# Patient Record
Sex: MALE | HISPANIC OR LATINO | Employment: FULL TIME | URBAN - METROPOLITAN AREA
[De-identification: names, ages, dates, MRNs, and addresses within clinical notes are randomized per-mention and may not be internally consistent; named-entity substitution may affect disease eponyms.]

---

## 2021-06-22 ENCOUNTER — HOSPITAL ENCOUNTER (EMERGENCY)
Facility: CLINIC | Age: 36
Discharge: HOME | End: 2021-06-22
Attending: PHYSICIAN ASSISTANT | Admitting: PHYSICIAN ASSISTANT

## 2021-06-22 ENCOUNTER — APPOINTMENT (OUTPATIENT)
Dept: GENERAL RADIOLOGY | Facility: CLINIC | Age: 36
End: 2021-06-22
Attending: PHYSICIAN ASSISTANT

## 2021-06-22 ENCOUNTER — APPOINTMENT (OUTPATIENT)
Dept: ULTRASOUND IMAGING | Facility: CLINIC | Age: 36
End: 2021-06-22
Attending: PHYSICIAN ASSISTANT

## 2021-06-22 VITALS
DIASTOLIC BLOOD PRESSURE: 100 MMHG | OXYGEN SATURATION: 98 % | SYSTOLIC BLOOD PRESSURE: 153 MMHG | TEMPERATURE: 98.2 F | RESPIRATION RATE: 18 BRPM | HEART RATE: 66 BPM

## 2021-06-22 DIAGNOSIS — M79.605 PAIN IN BOTH LOWER EXTREMITIES: ICD-10-CM

## 2021-06-22 DIAGNOSIS — M79.604 PAIN IN BOTH LOWER EXTREMITIES: ICD-10-CM

## 2021-06-22 LAB
ANION GAP SERPL CALCULATED.3IONS-SCNC: 5 MMOL/L (ref 3–14)
BASOPHILS # BLD AUTO: 0.1 10E9/L (ref 0–0.2)
BASOPHILS NFR BLD AUTO: 0.8 %
BUN SERPL-MCNC: 11 MG/DL (ref 7–30)
CALCIUM SERPL-MCNC: 9.6 MG/DL (ref 8.5–10.1)
CHLORIDE SERPL-SCNC: 102 MMOL/L (ref 94–109)
CO2 SERPL-SCNC: 29 MMOL/L (ref 20–32)
CREAT SERPL-MCNC: 0.81 MG/DL (ref 0.66–1.25)
CRP SERPL-MCNC: <2.9 MG/L (ref 0–8)
DIFFERENTIAL METHOD BLD: NORMAL
EOSINOPHIL # BLD AUTO: 0.1 10E9/L (ref 0–0.7)
EOSINOPHIL NFR BLD AUTO: 1.2 %
ERYTHROCYTE [DISTWIDTH] IN BLOOD BY AUTOMATED COUNT: 11.4 % (ref 10–15)
ERYTHROCYTE [SEDIMENTATION RATE] IN BLOOD BY WESTERGREN METHOD: 7 MM/H (ref 0–15)
GFR SERPL CREATININE-BSD FRML MDRD: >90 ML/MIN/{1.73_M2}
GLUCOSE SERPL-MCNC: 94 MG/DL (ref 70–99)
HCT VFR BLD AUTO: 48 % (ref 40–53)
HGB BLD-MCNC: 16.6 G/DL (ref 13.3–17.7)
IMM GRANULOCYTES # BLD: 0 10E9/L (ref 0–0.4)
IMM GRANULOCYTES NFR BLD: 0.2 %
LYMPHOCYTES # BLD AUTO: 2.4 10E9/L (ref 0.8–5.3)
LYMPHOCYTES NFR BLD AUTO: 27.8 %
MCH RBC QN AUTO: 30.5 PG (ref 26.5–33)
MCHC RBC AUTO-ENTMCNC: 34.6 G/DL (ref 31.5–36.5)
MCV RBC AUTO: 88 FL (ref 78–100)
MONOCYTES # BLD AUTO: 0.4 10E9/L (ref 0–1.3)
MONOCYTES NFR BLD AUTO: 5 %
NEUTROPHILS # BLD AUTO: 5.6 10E9/L (ref 1.6–8.3)
NEUTROPHILS NFR BLD AUTO: 65 %
NRBC # BLD AUTO: 0 10*3/UL
NRBC BLD AUTO-RTO: 0 /100
PLATELET # BLD AUTO: 368 10E9/L (ref 150–450)
POTASSIUM SERPL-SCNC: 3.9 MMOL/L (ref 3.4–5.3)
RBC # BLD AUTO: 5.45 10E12/L (ref 4.4–5.9)
SODIUM SERPL-SCNC: 136 MMOL/L (ref 133–144)
WBC # BLD AUTO: 8.6 10E9/L (ref 4–11)

## 2021-06-22 PROCEDURE — 80048 BASIC METABOLIC PNL TOTAL CA: CPT | Performed by: PHYSICIAN ASSISTANT

## 2021-06-22 PROCEDURE — 72100 X-RAY EXAM L-S SPINE 2/3 VWS: CPT

## 2021-06-22 PROCEDURE — 85025 COMPLETE CBC W/AUTO DIFF WBC: CPT | Performed by: PHYSICIAN ASSISTANT

## 2021-06-22 PROCEDURE — 99285 EMERGENCY DEPT VISIT HI MDM: CPT | Mod: 25

## 2021-06-22 PROCEDURE — 86140 C-REACTIVE PROTEIN: CPT | Performed by: PHYSICIAN ASSISTANT

## 2021-06-22 PROCEDURE — 93970 EXTREMITY STUDY: CPT

## 2021-06-22 PROCEDURE — 85652 RBC SED RATE AUTOMATED: CPT | Performed by: PHYSICIAN ASSISTANT

## 2021-06-22 PROCEDURE — 250N000013 HC RX MED GY IP 250 OP 250 PS 637: Performed by: PHYSICIAN ASSISTANT

## 2021-06-22 RX ORDER — OXYCODONE HYDROCHLORIDE 5 MG/1
5 TABLET ORAL EVERY 6 HOURS PRN
Qty: 12 TABLET | Refills: 0 | Status: SHIPPED | OUTPATIENT
Start: 2021-06-22 | End: 2021-06-24

## 2021-06-22 RX ORDER — IBUPROFEN 600 MG/1
600 TABLET, FILM COATED ORAL ONCE
Status: COMPLETED | OUTPATIENT
Start: 2021-06-22 | End: 2021-06-22

## 2021-06-22 RX ADMIN — IBUPROFEN 600 MG: 600 TABLET, FILM COATED ORAL at 16:54

## 2021-06-22 ASSESSMENT — ENCOUNTER SYMPTOMS
CHILLS: 0
NAUSEA: 1
FEVER: 0
BACK PAIN: 1
ABDOMINAL PAIN: 1
SHORTNESS OF BREATH: 0

## 2021-06-22 NOTE — LETTER
June 22, 2021      To Whom It May Concern:      Ancelmo James was seen in our Emergency Department today, 06/22/21.  I expect his condition to improve over the next 3 days.  He may return to work/school when improved.    Sincerely,        Francheska Feng RN

## 2021-06-22 NOTE — ED PROVIDER NOTES
.    History   Chief Complaint:  Bilateral leg pain    The history is provided by the spouse and the patient. The history is limited by a language barrier. A  was used (S[place]ankan, spouse interpreted).      Ancelmo James is a 36 year old male who presents with bilateral leg pain. The patient's wife reports that the patient began having bilateral leg pain radiating from his bottom to his feet with associated weakness starting yesterday (6/21). He tried going to work today but his pain worsened and he had to come to the ED. He is unable to find a comfortable position that alleviates his pain. He also reports having abdominal pain and nausea which began yesterday. No recent fever, chills, cough, congestion, chest pain, shortness of breath, and emesis. Of note, he had his second COVID shot on 6/13.    Review of Systems   Constitutional: Negative for chills and fever.   HENT: Negative for congestion.    Respiratory: Negative for shortness of breath.    Cardiovascular: Negative for chest pain.   Gastrointestinal: Positive for abdominal pain and nausea.   Musculoskeletal: Positive for back pain (lower).        Bilateral lower extremity pain   All other systems reviewed and are negative.      Allergies:  The patient has no known allergies.     Medications:  The patient is not currently taking any prescribed medications.    Past Medical History:    The patient denies past medical history     Social History:  The patient presents with wife  The patient's primary language is Sri Lankan.  Physical Exam     Patient Vitals for the past 24 hrs:   BP Temp Temp src Pulse Resp SpO2   06/22/21 1443 (!) 138/91 98.2  F (36.8  C) Temporal 70 18 98 %       Physical Exam  Constitutional: Pleasant. Cooperative.   Eyes: Pupils equally round and reactive  HENT: Head is normal in appearance. Oropharynx is normal with moist mucus membranes.  Cardiovascular: Regular rate and rhythm and without murmurs.  Respiratory: Normal  respiratory effort, lungs are clear bilaterally.  Abd: No TTP. Soft, non-distended.  Musculoskeletal: No asymmetry of the lower extremities, no tenderness to palpation. No midline TTP of back. No paraspinal TTP of back. Mild tenderness to posterior aspect of R distal thigh. Full ROM of bilateral lower extremities. 2+ DP pulses. 5/5 strength to bilateral lower extremities.  Skin: Normal, without rash.  Neurologic: Cranial nerves grossly intact, normal cognition, no focal deficits. Alert and oriented x 3. Sensation intact to bilateral lower extremities.  Psychiatric: Normal affect.  Nursing notes and vital signs reviewed.    Emergency Department Course   Imaging:  Lumbar spine XR, 2-3 views   Final Result   IMPRESSION: 5 nonrib-bearing lumbar type vertebral bodies. Straightening of the usual lumbar spine lordosis. Lumbar vertebral body heights are maintained. Minor lumbar spondylitic changes. Alignment of the lumbar vertebral bodies is anatomic on lateral    radiograph.      US Lower Extremity Venous Duplex Bilateral   Final Result   IMPRESSION:   1.  No deep venous thrombosis in the bilateral lower extremities.          Laboratory:    CBC: WBC: 8.6, HGB: 16.6, PLT: 368  BMP: WNL (Creatinine: 0.81)  CRP inflammation: <2.9  Erythrocyte sedimentation rate auto: 7    Emergency Department Course:    Reviewed:    I reviewed the patient's nursing notes, vitals, past medical records, Care Everywhere.     Assessments:    1625: I performed an exam of the patient and obtained history, as documented above.      1815: I rechecked the patient and updated them on findings. They are amenable to discharge.     Interventions:  1654: Ibuprofen 600 mg PO     Disposition:  The patient was discharged to home.       Impression & Plan   Medical Decision Making:  Ancelmo James is a 36 year old male who presents to the ED for evaluation of bilateral leg pain. See HPI as above for additional details. Vitals and physical exam as above.  DDx was broad and included DVT, referred pain from back, electrolyte abnormality, baker's cyst, GBS, MG, other neurologic issue, among others. Work up as above without clear evidence for etiology of patient's symptoms. No leukocytosis. No electrolyte abnormality. No elevation of inflammatory markers. No evidence for abnormality of imaging. Will provide patient Rx for oxycodone as below, discussed narcotic precautions. Advised very close follow up with PCP given unclear etiology of symptoms, close return precautions. Reassuring neurologic exam to lower extremities suggesting against GBS or MG at this time, although cannot be definitively ruled out. Overall, felt patient was safe for discharge to home. Discussed reasons to return. All questions answered. Patient discharged to home in stable condition.    Diagnosis:    ICD-10-CM    1. Pain in both lower extremities  M79.604     M79.605        Discharge Medications:  New Prescriptions    OXYCODONE (ROXICODONE) 5 MG TABLET    Take 1 tablet (5 mg) by mouth every 6 hours as needed for pain       Scribe Disclosure:  I, Lamine Smith, am serving as a scribe at 4:07 PM on 6/22/2021 to document services personally performed by Kaleb Melgoza PA-C based on my observations and the provider's statements to me.     This record was created at least in part using electronic voice recognition software, so please excuse any typographical errors.           Kaleb Melgoza PA-C  06/22/21 9660

## 2021-06-22 NOTE — ED TRIAGE NOTES
Diffuse bilateral leg pain. Standing or sitting doesn't make it work. Unable to get comfortable. No swelling or erythema. Legs feel weaker than normal

## 2021-06-24 ENCOUNTER — OFFICE VISIT (OUTPATIENT)
Dept: FAMILY MEDICINE | Facility: CLINIC | Age: 36
End: 2021-06-24

## 2021-06-24 VITALS
WEIGHT: 210 LBS | DIASTOLIC BLOOD PRESSURE: 80 MMHG | BODY MASS INDEX: 30.06 KG/M2 | TEMPERATURE: 98.2 F | OXYGEN SATURATION: 98 % | RESPIRATION RATE: 18 BRPM | SYSTOLIC BLOOD PRESSURE: 130 MMHG | HEIGHT: 70 IN | HEART RATE: 78 BPM

## 2021-06-24 DIAGNOSIS — M79.604 LEG PAIN, BILATERAL: Primary | ICD-10-CM

## 2021-06-24 DIAGNOSIS — M79.605 LEG PAIN, BILATERAL: Primary | ICD-10-CM

## 2021-06-24 DIAGNOSIS — R20.2 PARESTHESIA: ICD-10-CM

## 2021-06-24 LAB
FOLATE SERPL-MCNC: 17.2 NG/ML
VIT B12 SERPL-MCNC: 429 PG/ML (ref 193–986)

## 2021-06-24 PROCEDURE — 82746 ASSAY OF FOLIC ACID SERUM: CPT | Performed by: PHYSICIAN ASSISTANT

## 2021-06-24 PROCEDURE — 84443 ASSAY THYROID STIM HORMONE: CPT | Performed by: PHYSICIAN ASSISTANT

## 2021-06-24 PROCEDURE — 82607 VITAMIN B-12: CPT | Performed by: PHYSICIAN ASSISTANT

## 2021-06-24 PROCEDURE — 99214 OFFICE O/P EST MOD 30 MIN: CPT | Performed by: PHYSICIAN ASSISTANT

## 2021-06-24 PROCEDURE — 36415 COLL VENOUS BLD VENIPUNCTURE: CPT | Performed by: PHYSICIAN ASSISTANT

## 2021-06-24 RX ORDER — GABAPENTIN 100 MG/1
CAPSULE ORAL
Qty: 18 CAPSULE | Refills: 0 | Status: SHIPPED | OUTPATIENT
Start: 2021-06-24 | End: 2021-06-25

## 2021-06-24 RX ORDER — GABAPENTIN 100 MG/1
CAPSULE ORAL
Qty: 60 CAPSULE | Refills: 1 | Status: SHIPPED | OUTPATIENT
Start: 2021-06-24 | End: 2021-08-26

## 2021-06-24 ASSESSMENT — MIFFLIN-ST. JEOR: SCORE: 1898.8

## 2021-06-24 ASSESSMENT — PAIN SCALES - GENERAL: PAINLEVEL: EXTREME PAIN (8)

## 2021-06-24 NOTE — PROGRESS NOTES
Assessment & Plan     Leg pain, bilateral  Unclear etiology for patient's symptoms. Further evaluation with thyroid and B12 testing. Referral to neurology placed for further evaluation. Will treat with gabapentin in the mean time.  We will call with lab results.  - TSH with free T4 reflex  - NEUROLOGY ADULT REFERRAL  - gabapentin (NEURONTIN) 100 MG capsule; Increase dose by 100 mg every 3 days until taking 3 tablets 3 times daily.  - gabapentin (NEURONTIN) 100 MG capsule; Take 1 capsule (100 mg) by mouth daily for 3 days, THEN 1 capsule (100 mg) 2 times daily for 3 days, THEN 1 capsule (100 mg) 3 times daily for 3 days.    Paresthesia  As noted above.  - Folate  - Vitamin B12  - TSH with free T4 reflex  - NEUROLOGY ADULT REFERRAL  - gabapentin (NEURONTIN) 100 MG capsule; Increase dose by 100 mg every 3 days until taking 3 tablets 3 times daily.  - gabapentin (NEURONTIN) 100 MG capsule; Take 1 capsule (100 mg) by mouth daily for 3 days, THEN 1 capsule (100 mg) 2 times daily for 3 days, THEN 1 capsule (100 mg) 3 times daily for 3 days.    Review of external notes as documented elsewhere in note  Ordering of each unique test  Prescription drug management  36 minutes spent on the date of the encounter doing chart review, history and exam, documentation and further activities per the note        Rosa Leggett PA-C  [facility] NEFTALI Ag is a 36 year old who presents for the following health issues  accompanied by his spouse:    HPI     ED/UC Followup:    Facility:  [facility]   Date of visit: 06/22/2021  Reason for visit: leg pain   Current Status: still worst. Worst at night. Burning in the feet      Patient is a 36-year-old male who presents today for follow-up from the ER. Patient was seen in the ER on 6/22/2021 for evaluation of bilateral leg pain. The pain began with pain in the entire leg both sides. The patient notes currently he is having pain in the  "bottom of his feet. He was unable to find a comfortable position to alleviate pain. Patient had work up including CBC. BMP, CRP, ESR and ultrasound of the lower extremities as well as x-ray of the lumbar spine. Patient was provided with oxycodone for pain relieve and discharged to home for further evaluation with primary clinic. The oxycodone is not helping. He has also tried Tylenol with no improvement. He did try an antiinflammatory with some improvement in the leg pain.    He is having tingling and burning in the bilateral ankles and feet. Even having shoes on his feet is causing significant burning pain. He notes he is having some pain behind the knees bilaterally as well.      Review of Systems   GENERAL:  No fevers  MUSCULOSKELETAL: As noted in HPI            Objective    /80 (BP Location: Right arm, Patient Position: Sitting, Cuff Size: Adult Large)   Pulse 78   Temp 98.2  F (36.8  C)   Resp 18   Ht 1.778 m (5' 10\")   Wt 95.3 kg (210 lb)   SpO2 98%   BMI 30.13 kg/m    Body mass index is 30.13 kg/m .  Physical Exam   GENERAL: No acute distress  HEENT: Normocephalic  VASCULAR: 2+ DP and PT pulses bilaterally  EXTREMITIES: Bilateral feet with mild edema and erythema.  Right lower extremity: No baker's cyst noted on exam. Unable to reproduce pain. No obvious deformity. No tenderness over the lateral or medial malleolus or over the dorsum or plantar aspects of the foot.   Left lower extremity: No baker's cyst noted on exam. Unable to reproduce pain. No obvious deformity. No tenderness over the lateral or medial malleolus or over the dorsum or plantar aspects of the foot. Full range of motion with dorsiflexion and plantarflexion as well as internal and external rotation.  NEURO: Alert, non-focal          "

## 2021-06-25 ENCOUNTER — NURSE TRIAGE (OUTPATIENT)
Dept: NURSING | Facility: CLINIC | Age: 36
End: 2021-06-25

## 2021-06-25 DIAGNOSIS — M79.605 LEG PAIN, BILATERAL: ICD-10-CM

## 2021-06-25 DIAGNOSIS — R20.2 PARESTHESIA: ICD-10-CM

## 2021-06-25 DIAGNOSIS — M79.604 LEG PAIN, BILATERAL: ICD-10-CM

## 2021-06-25 LAB — TSH SERPL DL<=0.005 MIU/L-ACNC: 2.17 MU/L (ref 0.4–4)

## 2021-06-25 RX ORDER — GABAPENTIN 100 MG/1
CAPSULE ORAL
Qty: 15 CAPSULE | Refills: 0 | Status: SHIPPED | OUTPATIENT
Start: 2021-06-25 | End: 2021-08-31

## 2021-06-25 NOTE — TELEPHONE ENCOUNTER
Called pharmacy and spoke to Cookie.  Need to send new RX with directions to state the increased directions and on that RX put OK to fill 2nd RX early.  Please advise.  Fior Jennings RN

## 2021-06-25 NOTE — TELEPHONE ENCOUNTER
Please call patient and let him know that if he would like to increase to 1 tablet twice daily today he can. I would recommend staying on that dose for 3 days though.     Please also let him know that his testing for B12 and folate are normal. Thyroid testing pending.

## 2021-06-25 NOTE — TELEPHONE ENCOUNTER
Called and spoke with patient, relayed instructions to patient and result note. Patient verbalized understanding and will follow plan. Patient will contact clinic if has any issues or concerns.     Blane MORRELL RN

## 2021-06-25 NOTE — TELEPHONE ENCOUNTER
Called and spoke with patient via . Relayed provider message. Routing back to provider for further clarification. Can call patient back after thyroid testing resulted and reviewed if possible.     Blane MORRELL RN

## 2021-06-25 NOTE — TELEPHONE ENCOUNTER
Typically we increase dose after 3 days. So we are jumping the gun with increasing after 1 day. I would like him to be on the 1 tablet twice daily for 3 days prior to changing to 1 tablet three times daily.    He was instructed to increase the dose by 100 mg every 3 days after getting to 1 tablet three times daily.    Thyroid testing is normal.

## 2021-06-25 NOTE — TELEPHONE ENCOUNTER
Ancelmo will need a [place] Islander interpretor.    Ancelmo felt some relief yesterday after taking one 100 mg capsule of gabapentin. The relief only lasted a couple of hours.  He did not notice any side effects.  Ancelmo wants to know if he can increase his dose already today.    Ancelmo can be reached at [phone]    COVID 19 Nurse Triage Plan/Patient Instructions    Please be aware that novel coronavirus (COVID-19) may be circulating in the community. If you develop symptoms such as fever, cough, or SOB or if you have concerns about the presence of another infection including coronavirus (COVID-19), please contact your health care provider or visit [email or web address].     Disposition/Instructions    Home care recommended. Follow home care protocol based instructions.    Thank you for taking steps to prevent the spread of this virus.  o Limit your contact with others.  o Wear a simple mask to cover your cough.  o Wash your hands well and often.    Resources    [facility]: About COVID-19: [email or web address]    CDC: What to Do If You're Sick: www.cdc.gov/coronavirus/2019-ncov/about/steps-when-sick.html    CDC: Ending Home Isolation: www.cdc.gov/coronavirus/2019-ncov/hcp/disposition-in-home-patients.html     CDC: Caring for Someone: www.cdc.gov/coronavirus/2019-ncov/if-you-are-sick/care-for-someone.html     [facility]: Interim Guidance for Hospital Discharge to Home: www.health.[facility].mn.[place]/diseases/coronavirus/hcp/hospdischarge.pdf    [facility] clinical trials (COVID-19 research studies): clinicalaffairs.[facility]/umn-clinical-trials     Below are the COVID-19 hotlines at the [facility] ([facility]). Interpreters are available.   o For health questions: Call 075-090-2588 or 1-553.478.3469 (7 a.m. to 7 p.m.)  o For questions about schools and childcare: Call 183-382-3178 or 1-529.362.3379 (7 a.m. to 7 p.m.)     Additional Information    Nursing judgment    Protocols used:  INFORMATION ONLY CALL - NO TRIAGE-A-OH    Regina CARTAGENA, RN [facility]

## 2021-06-25 NOTE — TELEPHONE ENCOUNTER
Patient was given 2 prescriptions for Gabapentin, one being a tapering dose and one a daily dose.   Yesterday was told can increase dose right away to 1 tab twice a day. He will run out sooner and will start daily dose sooner.   Due to leaving tomorrow for a week ,would like to pick other prescription up today and pharmacy needs ok to fill sooner      Cristy YOUSIFN, RN

## 2021-08-26 DIAGNOSIS — M79.604 LEG PAIN, BILATERAL: ICD-10-CM

## 2021-08-26 DIAGNOSIS — R20.2 PARESTHESIA: ICD-10-CM

## 2021-08-26 DIAGNOSIS — M79.605 LEG PAIN, BILATERAL: ICD-10-CM

## 2021-08-26 RX ORDER — GABAPENTIN 100 MG/1
CAPSULE ORAL
Qty: 60 CAPSULE | Refills: 1 | Status: SHIPPED | OUTPATIENT
Start: 2021-08-26 | End: 2021-08-31

## 2021-08-26 NOTE — TELEPHONE ENCOUNTER
Needs request filled urgently he is currently out of medication. Lor Wilson RN on 8/26/2021 at 5:08 PM

## 2021-08-26 NOTE — TELEPHONE ENCOUNTER
See message below and please assist patient in scheduling.     BENJA SidhuN, RN  [facility]

## 2021-08-31 ENCOUNTER — OFFICE VISIT (OUTPATIENT)
Dept: FAMILY MEDICINE | Facility: CLINIC | Age: 36
End: 2021-08-31

## 2021-08-31 VITALS
RESPIRATION RATE: 16 BRPM | WEIGHT: 216.6 LBS | BODY MASS INDEX: 31.08 KG/M2 | DIASTOLIC BLOOD PRESSURE: 90 MMHG | SYSTOLIC BLOOD PRESSURE: 138 MMHG | OXYGEN SATURATION: 98 % | HEART RATE: 78 BPM

## 2021-08-31 DIAGNOSIS — J30.89 SEASONAL ALLERGIC RHINITIS DUE TO OTHER ALLERGIC TRIGGER: Primary | ICD-10-CM

## 2021-08-31 DIAGNOSIS — M79.605 LEG PAIN, BILATERAL: ICD-10-CM

## 2021-08-31 DIAGNOSIS — M79.604 LEG PAIN, BILATERAL: ICD-10-CM

## 2021-08-31 DIAGNOSIS — R20.2 PARESTHESIA: ICD-10-CM

## 2021-08-31 PROCEDURE — 99213 OFFICE O/P EST LOW 20 MIN: CPT | Performed by: PHYSICIAN ASSISTANT

## 2021-08-31 RX ORDER — MOMETASONE FUROATE MONOHYDRATE 50 UG/1
2 SPRAY, METERED NASAL DAILY
Qty: 17 G | Refills: 1 | Status: SHIPPED | OUTPATIENT
Start: 2021-08-31 | End: 2021-09-21

## 2021-08-31 RX ORDER — GABAPENTIN 100 MG/1
100 CAPSULE ORAL 3 TIMES DAILY
Qty: 90 CAPSULE | Refills: 1 | Status: SHIPPED | OUTPATIENT
Start: 2021-08-31 | End: 2021-09-21

## 2021-08-31 RX ORDER — LEVOCETIRIZINE DIHYDROCHLORIDE 5 MG/1
5 TABLET, FILM COATED ORAL EVERY EVENING
Qty: 30 TABLET | Refills: 1 | Status: SHIPPED | OUTPATIENT
Start: 2021-08-31 | End: 2021-09-21

## 2021-08-31 NOTE — PROGRESS NOTES
Assessment & Plan     Seasonal allergic rhinitis due to other allergic trigger  Patient interested in allergy testing. Will try a different antihistamine in the mean time along with steroid nasal spray as noted below.  - Adult Allergy/Asthma Referral; Future  - levocetirizine (XYZAL) 5 MG tablet; Take 1 tablet (5 mg) by mouth every evening  - mometasone (NASONEX) 50 MCG/ACT nasal spray; Spray 2 sprays into both nostrils daily  - Care Coordination Referral; Future    Leg pain, bilateral  Improved with gabapentin 100 mg three times daily. Has yet to follow up with neurology.  - gabapentin (NEURONTIN) 100 MG capsule; Take 1 capsule (100 mg) by mouth 3 times daily Increase dose by 100 mg every 3 days until taking 3 tablets 3 times daily.  - Care Coordination Referral; Future    Paresthesia  As noted above.  - gabapentin (NEURONTIN) 100 MG capsule; Take 1 capsule (100 mg) by mouth 3 times daily Increase dose by 100 mg every 3 days until taking 3 tablets 3 times daily.  - Care Coordination Referral; Future    Review of external notes as documented elsewhere in note  Prescription drug management    Rosa Leggett PA-C  [facility] GAGE Ag is a 36 year old who presents for the following health issues  accompanied by his spouse (acting as partial ):    HPI     Allergies  Onset/Duration: About 5 yrs   Symptoms:   Nasal congestion: YES- A lot. Patient can't breath through his nose when his allergies are acting up because the nose is so plugged and has to breath through his nose  Sneezing: YES  Red, itchy eyes: YES eyes are really swelled in the morning and really itchy  Progression of Symptoms: worse  Accompanying Signs & Symptoms:  Cough: no  Wheezing: YES  Rash: no  Sinus/facial pain: no  History:   Is it seasonal: in the fall and in the spring   History of Asthma: no  Has allergy testing been done: no  Precipitating factors:   None  Alleviating  factors:  None  Therapies tried and outcome: Zyrtec D for two years. Pharmacy said he should not take it no more because they can only sell it to someone so many times    Review of Systems   GENERAL:  No fevers         Objective    BP (!) 138/90 (BP Location: Right arm, Patient Position: Sitting, Cuff Size: Adult Large)   Pulse 78   Resp 16   Wt 98.2 kg (216 lb 9.6 oz)   SpO2 98%   BMI 31.08 kg/m    Body mass index is 31.08 kg/m .  Physical Exam   GENERAL: No acute distress  HEENT: Normocephalic, PERRL, Canals patent, bilateral TM's non-erythematous and non-bulging. Turbinates normal in appearance bilaterally. Posterior oropharynx non-erythematous and without exudate.  NECK: No cervical or supraclavicular lymphadenopathy.  CARDIAC: Regular rate and rhythm. No murmurs.  PULMONARY: Lungs are clear to auscultation bilaterally. No wheezes, rhonchi or crackles.  NEURO: Alert and non-focal

## 2021-09-01 ENCOUNTER — APPOINTMENT (OUTPATIENT)
Dept: INTERPRETER SERVICES | Facility: CLINIC | Age: 36
End: 2021-09-01

## 2021-09-01 ENCOUNTER — PATIENT OUTREACH (OUTPATIENT)
Dept: NURSING | Facility: CLINIC | Age: 36
End: 2021-09-01

## 2021-09-01 NOTE — PROGRESS NOTES
Clinic Care Coordination Contact  Community Health Worker Initial Outreach  Spoke with patient using     Chart Review: Referral from PCP for insurance. F/u appt on 9/21    CHW Initial Information Gathering:  Referral Source: PCP  Preferred Hospital: [facility] [place]  [phone]  Current living arrangement:: I live in a private home with family  Supplies used at home:: None  Equipment Currently Used at Home: none  No PCP office visit in Past Year: No  CHW Additional Questions  If ED/Hospital discharge, follow-up appointment scheduled as recommended?: N/A  Medication changes made following ED/Hospital discharge?: N/A  MyChart active?: No    CHW introduced self and role with CC  Patient states he is currently at work, lots of background noise but ok to talk quickly.   Patient states that he is interested in applying for health insurance, he has been wondering how to begin the process.   CHW explained FRW and SW roles, submitted FRW referral as below and scheduled SW f/u.     Patient accepts CC: Yes. Patient scheduled for assessment with SWCC on 9/2 at 4:00pm. Patient noted desire to discuss assistance applying for health insurnace .     Financial Resource Worker Screening    [facility] Benefits  Is patient requesting help applying for [facility] benefits?: No    [place]urance:  [place] No  Is this an insurance renewal?: No  Is this a new insurance application request?: Yes  Have you recently applied for insurance?: No  How many people in your household? : 4  Do you file taxes?: Yes  How many dependents do you claim?: 2  What is the monthly gross income for the household (wages, social security, workers comp, and pension)? : 4000      Care Coordination team will tell patient:   Thank you for answering all the questions, based on screening questions, our Financial Resource Worker will reach out to you with additional questions and next steps.    Silvia  SHARLENE Love, B.S. [facility]  Clinic Care Coordination  [facility]:  [place], [place] and [place]  [phone]  [email or web address]

## 2021-09-02 ENCOUNTER — APPOINTMENT (OUTPATIENT)
Dept: INTERPRETER SERVICES | Facility: CLINIC | Age: 36
End: 2021-09-02

## 2021-09-02 ENCOUNTER — PATIENT OUTREACH (OUTPATIENT)
Dept: CARE COORDINATION | Facility: CLINIC | Age: 36
End: 2021-09-02

## 2021-09-02 NOTE — PROGRESS NOTES
Left voicemail for patient letting him know that [facility] will be unavailable to call him today (9/2) at 4:00pm and she will outreach to him tomorrow at 4pm instead.     Silvia Love, SHARLENE, B.S. [facility] Care Coordination  [facility]:  [place], [place] and [place]  [phone]  [email or web address]

## 2021-09-02 NOTE — TELEPHONE ENCOUNTER
Clinic Care Coordination Contact  Program: Health Insurance   County:  [facility] Case #:  [facility] Worker:   Latesha #:   Subscriber #:   Renewal:  Date Applied:     HERNÁN Outreach:   9/2/21: FRW called patient on referral. Patient's family yearly income is $57,000 gross. Patient is over income for MA/MNCARE. Patient will need to wait until open enrollment in November to apply for insurance through [employer]. Routing note to CC team.     Health Insurance:      Referral/Screening:     Financial Resource Worker Screening     [facility] Benefits  Is patient requesting help applying for [facility] benefits?: No     Insurance:  [place]?: No  Is this an insurance renewal?: No  Is this a new insurance application request?: Yes  Have you recently applied for insurance?: No  How many people in your household? : 4  Do you file taxes?: Yes   How many dependents do you claim?: 2  What is the monthly gross income for the household (wages, social security, workers comp, and pension)? : 4800        Care Coordination team will tell patient:   Thank you for answering all the questions, based on screening questions, our Financial Resource Worker will reach out to you with additional questions and next steps.    Goals Addressed:

## 2021-09-09 ENCOUNTER — APPOINTMENT (OUTPATIENT)
Dept: INTERPRETER SERVICES | Facility: CLINIC | Age: 36
End: 2021-09-09

## 2021-09-09 ENCOUNTER — PATIENT OUTREACH (OUTPATIENT)
Dept: CARE COORDINATION | Facility: CLINIC | Age: 36
End: 2021-09-09

## 2021-09-09 NOTE — TELEPHONE ENCOUNTER
Clinic Care Coordination Contact  Program: [facility]  County:  [facility] Case #:  [place] Worker:   Latesha #:   Subscriber #:   Renewal:  Date Applied:     FRW Outreach:   9/9/21: FRW called patient to discuss erich care. FRW left VM and will make another outreach in 1 week.   9/2/21: FRW called patient on referral. Patient's family yearly income is $57,000 gross. Patient is over income for MA/MNCARE. Patient will need to wait until open enrollment in November to apply for insurance [facility]. Routing note to CC team.     Health Insurance:      Referral/Screening:     Financial Resource Worker Screening     [facility] Benefits  Is patient requesting help applying for [facility] benefits?: No     Insurance:  [place]?: No  Is this an insurance renewal?: No  Is this a new insurance application request?: Yes  Have you recently applied for insurance?: No  How many people in your household? : 4  Do you file taxes?: Yes   How many dependents do you claim?: 2  What is the monthly gross income for the household (wages, social security, workers comp, and pension)? : 4800        Care Coordination team will tell patient:   Thank you for answering all the questions, based on screening questions, our Financial Resource Worker will reach out to you with additional questions and next steps.    Goals Addressed:

## 2021-09-15 ENCOUNTER — PATIENT OUTREACH (OUTPATIENT)
Dept: CARE COORDINATION | Facility: CLINIC | Age: 36
End: 2021-09-15

## 2021-09-15 ENCOUNTER — APPOINTMENT (OUTPATIENT)
Dept: INTERPRETER SERVICES | Facility: CLINIC | Age: 36
End: 2021-09-15

## 2021-09-15 NOTE — TELEPHONE ENCOUNTER
Clinic Care Coordination Contact  Program: [facility]  County:  [facility] Case #:  [facility] Worker:   Latesha #:   Subscriber #:   Renewal:  Date Applied:     FRW Outreach:   9/15/21: Outreach attempted x 2. Left message on voicemail indicating last outreach attempt.   Plan: FRW will send an unreachable letter and remove from panel. Patient can be referred back to FRW.      9/9/21: FRW called patient to discuss erich care. FRW left VM and will make another outreach in 1 week.   9/2/21: FRW called patient on referral. Patient's family yearly income is $57,000 gross. Patient is over income for MA/MNCARE. Patient will need to wait until open enrollment in November to apply for insurance through [employer]. Routing note to CC team.     Health Insurance:      Referral/Screening:     Financial Resource Worker Screening     [facility] Benefits  Is patient requesting help applying for [facility] benefits?: No     Insurance:  [place]?: No  Is this an insurance renewal?: No  Is this a new insurance application request?: Yes  Have you recently applied for insurance?: No  How many people in your household? : 4  Do you file taxes?: Yes   How many dependents do you claim?: 2  What is the monthly gross income for the household (wages, social security, workers comp, and pension)? : 4800        Care Coordination team will tell patient:   Thank you for answering all the questions, based on screening questions, our Financial Resource Worker will reach out to you with additional questions and next steps.    Goals Addressed:

## 2021-09-21 ENCOUNTER — OFFICE VISIT (OUTPATIENT)
Dept: FAMILY MEDICINE | Facility: CLINIC | Age: 36
End: 2021-09-21

## 2021-09-21 VITALS
SYSTOLIC BLOOD PRESSURE: 135 MMHG | BODY MASS INDEX: 32.58 KG/M2 | HEIGHT: 69 IN | OXYGEN SATURATION: 99 % | WEIGHT: 220 LBS | TEMPERATURE: 98.1 F | DIASTOLIC BLOOD PRESSURE: 87 MMHG | HEART RATE: 62 BPM

## 2021-09-21 DIAGNOSIS — M79.604 LEG PAIN, BILATERAL: ICD-10-CM

## 2021-09-21 DIAGNOSIS — R20.2 PARESTHESIA: ICD-10-CM

## 2021-09-21 DIAGNOSIS — M79.605 LEG PAIN, BILATERAL: ICD-10-CM

## 2021-09-21 DIAGNOSIS — Z23 NEED FOR PROPHYLACTIC VACCINATION AND INOCULATION AGAINST INFLUENZA: Primary | ICD-10-CM

## 2021-09-21 DIAGNOSIS — J30.89 SEASONAL ALLERGIC RHINITIS DUE TO OTHER ALLERGIC TRIGGER: ICD-10-CM

## 2021-09-21 DIAGNOSIS — G62.9 PERIPHERAL POLYNEUROPATHY: ICD-10-CM

## 2021-09-21 PROCEDURE — 90471 IMMUNIZATION ADMIN: CPT | Performed by: FAMILY MEDICINE

## 2021-09-21 PROCEDURE — 90686 IIV4 VACC NO PRSV 0.5 ML IM: CPT | Performed by: FAMILY MEDICINE

## 2021-09-21 PROCEDURE — 99214 OFFICE O/P EST MOD 30 MIN: CPT | Mod: 25 | Performed by: FAMILY MEDICINE

## 2021-09-21 RX ORDER — FLUTICASONE PROPIONATE 50 MCG
1 SPRAY, SUSPENSION (ML) NASAL DAILY
Qty: 48 G | Refills: 3 | Status: SHIPPED | OUTPATIENT
Start: 2021-09-21 | End: 2021-09-21

## 2021-09-21 RX ORDER — FEXOFENADINE HCL 180 MG/1
180 TABLET ORAL DAILY
Qty: 90 TABLET | Refills: 3 | Status: SHIPPED | OUTPATIENT
Start: 2021-09-21 | End: 2022-12-02

## 2021-09-21 RX ORDER — GABAPENTIN 100 MG/1
100 CAPSULE ORAL 3 TIMES DAILY
Qty: 270 CAPSULE | Refills: 3 | Status: SHIPPED | OUTPATIENT
Start: 2021-09-21 | End: 2023-12-14

## 2021-09-21 RX ORDER — FLUTICASONE PROPIONATE 50 MCG
2 SPRAY, SUSPENSION (ML) NASAL DAILY
Qty: 48 G | Refills: 3 | Status: SHIPPED | OUTPATIENT
Start: 2021-09-21 | End: 2024-04-11

## 2021-09-21 ASSESSMENT — MIFFLIN-ST. JEOR: SCORE: 1928.29

## 2021-09-21 NOTE — PROGRESS NOTES
"    Assessment & Plan     Need for prophylactic vaccination and inoculation against influenza    - INFLUENZA VACCINE IM > 6 MONTHS VALENT IIV4 (AFLURIA/FLUZONE)    Seasonal allergic rhinitis due to other allergic trigger  Increase dose of flonase to 2 sprays in both nostril advise not to inhale as he sprays, and switch to Allegra once daily, check on patient in 1 month by nurse.   - fluticasone (FLONASE) 50 MCG/ACT nasal spray; Spray 1 spray into both nostrils daily  - fexofenadine (ALLEGRA) 180 MG tablet; Take 1 tablet (180 mg) by mouth daily    Leg pain, bilateral  Mostly peripherla neuropathy, improving significantly on neurontin, keep on same management and recheck with specialist if no improvement (pt will wait due to insurance to see the specialist at this time)  - gabapentin (NEURONTIN) 100 MG capsule; Take 1 capsule (100 mg) by mouth 3 times daily Increase dose by 100 mg every 3 days until taking 3 tablets 3 times daily.    Paresthesia  As above.  - gabapentin (NEURONTIN) 100 MG capsule; Take 1 capsule (100 mg) by mouth 3 times daily Increase dose by 100 mg every 3 days until taking 3 tablets 3 times daily.    Peripheral polyneuropathy  As above.               BMI:   Estimated body mass index is 32.49 kg/m  as calculated from the following:    Height as of this encounter: 1.753 m (5' 9\").    Weight as of this encounter: 99.8 kg (220 lb).   Weight management plan: Discussed healthy diet and exercise guidelines        Return in about 4 weeks (around 10/19/2021) for by nurse call only..    Bandar Thakkar MD  [facility] GAGE Ag is a 36 year old who presents for the following health issues     HPI     New Patient/Transfer of Care  Medication Followup of Gabapentin, levocetirizine, and mometasone     Taking Medication as prescribed: yes    Side Effects:  None    Medication Helping Symptoms:  yes     Chronic Pain Follow-Up    Where in your body do you have pain? Bottom of the " "feet all the way to the calves.   How has your pain affected your ability to work? At the beginning he was struggling with pain that he couldn't go to work, but now he is doing well.  Which of these pain treatments have you tried since your last clinic visit? Gabapentin.  How well are you sleeping? Good  How has your mood been since your last visit? About the same  Have you had a significant life event? No  Other aggravating factors: prolonged standing  Taking medication as directed? Yes    No flowsheet data found.  No flowsheet data found.  No flowsheet data found.  Encounter-Level CSA:    There are no encounter-level csa.     Patient-Level CSA:    There are no patient-level csa.         ALLERGIES      Duration: chronic.    Description:   Nasal congestion: YES- plugged nose, along with itching in the throat and nose.  Sneezing: no  Red, itchy eyes: YES    Accompanying signs and symptoms: runny nose    History (similar episodes/allergy testing): None    Precipitating or alleviating factors: not asked.    Therapies tried and outcome: pt takes levocetrizine and nasal steroids with mild improvement.        Review of Systems         Objective    /87   Pulse 62   Temp 98.1  F (36.7  C) (Oral)   Ht 1.753 m (5' 9\")   Wt 99.8 kg (220 lb)   SpO2 99%   BMI 32.49 kg/m    Body mass index is 32.49 kg/m .  Physical Exam   GENERAL: healthy, alert and no distress  HENT: normal cephalic/atraumatic, ear canals and TM's normal, nasal mucosa edematous , rhinorrhea clear, oropharynx clear and oral mucous membranes moist  foot exam: normal DP and PT pulses, no trophic changes or ulcerative lesions and normal sensory exam  Onychomycosis of some of the toenails.              "

## 2021-10-07 ENCOUNTER — PATIENT OUTREACH (OUTPATIENT)
Dept: CARE COORDINATION | Facility: CLINIC | Age: 36
End: 2021-10-07

## 2021-10-07 ENCOUNTER — APPOINTMENT (OUTPATIENT)
Dept: INTERPRETER SERVICES | Facility: CLINIC | Age: 36
End: 2021-10-07

## 2021-10-07 ENCOUNTER — APPOINTMENT (OUTPATIENT)
Dept: NURSING | Facility: CLINIC | Age: 36
End: 2021-10-07

## 2021-10-07 NOTE — PROGRESS NOTES
Clinic Care Coordination Contact  Community Health Worker Follow Up  Spoke with patient via      Care Gaps:   Health Maintenance Due   Topic Date Due     PREVENTIVE CARE VISIT  Never done     ADVANCE CARE PLANNING  Never done     HIV SCREENING  Never done     HEPATITIS C SCREENING  Never done     Not assessed, patient without health insurance    Goals:   Goals Addressed as of 10/8/2021 at 8:41 AM                    10/7/21       Financial Wellbeing (pt-stated)   10%    Added 9/7/21 by Bhavana Laird LSW      Goal Statement: In the next three months I will increase my sense financial well being  Date Goal set: 9/3/21  Barriers: Currently without health insurance.  I do not qualify for MA  Strengths: I am working  Date to Achieve By: 12/30/21  Patient expressed understanding of goal: Yes  Action steps to achieve this goal:  1. I will apply for [employer] during open enrollment.  Care Coordination will place FRW referral when appropriate.    2. I will [place] applying [place] if needed.  3. I will explore if scholarships are available and/or setting up a payment plan with Neurology in order to be seen while I am without insurance.    4.  Care Coordination will assist  with this goals as needed.    10/7/21 CHW:    Patient states that he has enough pain medication from seeing PCP recently that will cover him until he has insurance to see neurology. Patient was instructed by FRW to apply for [employer] during open enrollment on Nov. 1. He requests help to assist with the application at that time.           Intervention and Education during outreach: CC goal, patient will continue to manage his pain and reach out to the clinic if needed. He will plan to work with FRW on/after Nov. 1 to assist with [employer] application.   Patient denies needing any outstanding questions or concerns.     CHW Plan: We discussed next outreach 3 weeks.     SHARLENE Busch, B.S. [facility]  Coordination  [facility] Clinics:  [place], [place] and [place]  [phone]  [email or web address]

## 2021-10-18 ENCOUNTER — PATIENT OUTREACH (OUTPATIENT)
Dept: CARE COORDINATION | Facility: CLINIC | Age: 36
End: 2021-10-18

## 2021-10-18 ASSESSMENT — ACTIVITIES OF DAILY LIVING (ADL): DEPENDENT_IADLS:: INDEPENDENT

## 2021-10-18 NOTE — LETTER
[facility] CARE COORDINATION  [facility]   December 3, 2021    Ancelmo James  [address]  [place] [address]      Dear Ancelmo,    I have been unsuccessful in reaching you since our last contact. At this time the Care Coordination team will make no further attempts to reach you, however this does not change your ability to continue receiving care from your providers at your primary care clinic. If you need additional support from a care coordinator in the future please contact me at [phone].    All of us at [facility]  are invested in your health and are here to assist you in meeting your goals.     Sincerely,    JOHN Rodriguez   Care Coordination Team  [phone]

## 2021-10-18 NOTE — PROGRESS NOTES
Clinic Care Coordination Contact  Care Coordination Clinician Chart Review  Situation: Patient chart reviewed by care coordinator.       Background: Care Coordination initial assessment and enrollment to Care Coordination was 9/3/21.   Patient centered goals were developed with participation from patient.  CHRISTINE CC handed patient off to CHW for continued outreach every 30 days.        Assessment: Per chart review, patient outreach completed by CC CHW on 10/7/21.  Patient is actively working to accomplish goal.  Patient's goal remains appropriate and relevant at this time.   Patient is not due for updated Plan of Care.  Annual assessment will be due 9/3/22.      Goals        Patient Stated       Financial Wellbeing (pt-stated)       Goal Statement: In the next three months I will increase my sense financial well being  Date Goal set: 9/3/21  Barriers: Currently without health insurance.  I do not qualify for MA  Strengths: I am working  Date to Achieve By: 12/30/21  Patient expressed understanding of goal: Yes  Action steps to achieve this goal:  1. I will apply for [facility] during open enrollment.  Care Coordination will place FRW referral when appropriate.    2. I [place].  3. I will explore if scholarships are available and/or setting up a payment plan with Neurology in order to be seen while I am without insurance.    4.  Care Coordination will assist  with this goals as needed.                  Plan/Recommendations: The patient will continue working with Care Coordination to achieve goal as above.  CHW will involve CHRISTINE BAUMAN as needed or if patient is ready to move to maintenance.  CHRISTINE CC will continue to monitor progress to goals and CHW outreaches every 6 weeks.   Plan of Care updated and mailed to patient: JOHN Banerjee Care Coordination Team  [phone]

## 2021-10-21 ENCOUNTER — TELEPHONE (OUTPATIENT)
Dept: FAMILY MEDICINE | Facility: CLINIC | Age: 36
End: 2021-10-21

## 2021-10-21 NOTE — TELEPHONE ENCOUNTER
Can we call Ancelmo and check on his allergies, are the medicine working?  Bandar Thakkar MD  [facility]  [phone]

## 2021-10-25 NOTE — TELEPHONE ENCOUNTER
Attempt #1 to reach patient regarding message below with the assistance of a . Left voicemail to return phone call to clinic.       BENJA SidhuN, RN  [facility]

## 2021-11-03 ENCOUNTER — PATIENT OUTREACH (OUTPATIENT)
Dept: CARE COORDINATION | Facility: CLINIC | Age: 36
End: 2021-11-03

## 2021-11-03 NOTE — PROGRESS NOTES
Clinic Care Coordination Contact  [facility]/Voicemail       Clinical Data: Care Coordinator Outreach  Outreach attempted x 1.  Left message on patient's voicemail with call back information and requested return call.    Plan: Care Coordinator will try to reach patient again in 10 business days.    SHARLENE Busch, B.S. [facility] Care Coordination  [facility]:  [place] and [place]  [phone]  [email or web address]

## 2021-11-23 ENCOUNTER — PATIENT OUTREACH (OUTPATIENT)
Dept: CARE COORDINATION | Facility: CLINIC | Age: 36
End: 2021-11-23

## 2021-11-23 NOTE — PROGRESS NOTES
Clinic Care Coordination Contact  [facility]/Voicemail       Clinical Data: Care Coordinator Outreach  Outreach attempted x 2.  Left message on patient's voicemail with call back information and requested return call.    Plan: CHW unsuccessful in multiple recent attempts to contact patient and will route to lead CC to review for disenrollment and/or further direction per standard work. Care Coordinator will do no further outreaches at this time.    SHARLENE Busch, B.S. [facility] Care Coordination  [facility] Clinics:  [place], [place] and [place]  [phone]  [email or web address]

## 2021-12-03 NOTE — PROGRESS NOTES
Clinic Care Coordination Contact    CHW has not been able to reach patient after two attempts.  Patient will be sent via mail a dis enrollment letter via mail and no further outreach attempts will be made at this time.      JOHN Rodriguez   Care Coordination Team  [phone]

## 2022-12-01 DIAGNOSIS — J30.89 SEASONAL ALLERGIC RHINITIS DUE TO OTHER ALLERGIC TRIGGER: ICD-10-CM

## 2022-12-01 NOTE — LETTER
December 2, 2022      Ancelmo James  [address]  [place] [address]      Dear Ancelmo,    We recently received a call from your pharmacy requesting a refill of your medication.    We have authorized one refill but a review of your chart indicates that an appointment is required with your provider for any further refills.  Please call the clinic to schedule your appointment.    We have authorized one refill of your medication to allow time for you to schedule.   If you have a history of diabetes or high cholesterol, please come in fasting for the appointment. Fasting entails nothing to eat or drink 8 hours prior to your appointment; with the exception on water. You may take your medication the day of the appointment.    Thank you,      MARCIA Mack Morse

## 2022-12-02 RX ORDER — FEXOFENADINE HCL 180 MG/1
180 TABLET ORAL DAILY
Qty: 90 TABLET | Refills: 0 | Status: SHIPPED | OUTPATIENT
Start: 2022-12-02 | End: 2024-04-11

## 2022-12-02 NOTE — TELEPHONE ENCOUNTER
Medication is being filled for 1 time refill only due to:  Patient needs to be seen because it has been more than one year since last visit.     Eliana refill sent. Routing to /[facility] to assist with getting pt scheduled.    Prescription approved per [facility] Refill Protocol.  Jessica Mckay RN

## 2023-12-14 ENCOUNTER — VIRTUAL VISIT (OUTPATIENT)
Dept: INTERNAL MEDICINE | Facility: CLINIC | Age: 38
End: 2023-12-14
Payer: COMMERCIAL

## 2023-12-14 DIAGNOSIS — J30.89 SEASONAL ALLERGIC RHINITIS DUE TO OTHER ALLERGIC TRIGGER: Primary | ICD-10-CM

## 2023-12-14 PROCEDURE — 99213 OFFICE O/P EST LOW 20 MIN: CPT | Mod: 95 | Performed by: INTERNAL MEDICINE

## 2023-12-14 RX ORDER — CETIRIZINE HYDROCHLORIDE 10 MG/1
10 TABLET ORAL DAILY
Qty: 90 TABLET | Refills: 4 | Status: SHIPPED | OUTPATIENT
Start: 2023-12-14 | End: 2024-04-11

## 2023-12-14 RX ORDER — FEXOFENADINE HCL 180 MG/1
180 TABLET ORAL DAILY
Qty: 90 TABLET | Refills: 4 | Status: CANCELLED | OUTPATIENT
Start: 2023-12-14

## 2023-12-14 NOTE — PROGRESS NOTES
Ancelmo is a 38 year old who is being evaluated via a billable telephone visit.      What phone number would you like to be contacted at? [phone]   How would you like to obtain your AVS? [employer]  Distant Location (provider location):  Off-site    Assessment & Plan   Seasonal allergic rhinitis due to other allergic trigger  Symptoms ongoing, does not feel the fexofenadine works as well as it used to. Recommended trial of a different anti-histamine, rx'd cetirizine. If no improvement or symptoms worsen, recommend in-person evaluation with regular care team.  - cetirizine (ZYRTEC) 10 MG tablet; Take 1 tablet (10 mg) by mouth daily    F/u with regular PCP at regular interval or sooner PRN    Quinn White MD  [facility]   Ancelmo is a 38 year old who presents for a next day acute care virtual telephone visit with chief concern of: med refill. This is the first time I have met Ancelmo, who typically gets care at clinics closer to his residence. We utilized a  for today's visit. He's hoping for a refill of fexofenadine for allergies. He does not feel the effect of this medication is lasting as long as it used to.    Review of Systems   Constitutional, HEENT systems are negative, except as otherwise noted.      Objective     Vitals: No vitals were obtained today due to virtual visit.    Physical Exam   healthy, alert, and no distress  PSYCH: Alert and oriented times 3; coherent speech, normal rate and volume, able to articulate logical thoughts, able to abstract reason, no tangential thoughts, no hallucinations or delusions  His affect is normal  RESP: No cough, no audible wheezing, able to talk in full sentences  Remainder of exam unable to be completed due to telephone visits    Phone call duration: 6 minutes

## 2024-04-11 ENCOUNTER — OFFICE VISIT (OUTPATIENT)
Dept: INTERNAL MEDICINE | Facility: CLINIC | Age: 39
End: 2024-04-11
Payer: COMMERCIAL

## 2024-04-11 VITALS
DIASTOLIC BLOOD PRESSURE: 78 MMHG | HEIGHT: 69 IN | BODY MASS INDEX: 34.66 KG/M2 | TEMPERATURE: 98.8 F | WEIGHT: 234 LBS | HEART RATE: 69 BPM | OXYGEN SATURATION: 98 % | RESPIRATION RATE: 18 BRPM | SYSTOLIC BLOOD PRESSURE: 116 MMHG

## 2024-04-11 DIAGNOSIS — Z23 NEED FOR TDAP VACCINATION: ICD-10-CM

## 2024-04-11 DIAGNOSIS — R09.81 NOSE CONGESTION: ICD-10-CM

## 2024-04-11 DIAGNOSIS — R06.83 SNORING: ICD-10-CM

## 2024-04-11 DIAGNOSIS — Z00.00 ROUTINE GENERAL MEDICAL EXAMINATION AT A HEALTH CARE FACILITY: Primary | ICD-10-CM

## 2024-04-11 DIAGNOSIS — Z11.4 SCREENING FOR HIV (HUMAN IMMUNODEFICIENCY VIRUS): ICD-10-CM

## 2024-04-11 DIAGNOSIS — Z11.59 NEED FOR HEPATITIS C SCREENING TEST: ICD-10-CM

## 2024-04-11 DIAGNOSIS — J01.90 ACUTE SINUSITIS WITH SYMPTOMS > 10 DAYS: ICD-10-CM

## 2024-04-11 DIAGNOSIS — J30.89 SEASONAL ALLERGIC RHINITIS DUE TO OTHER ALLERGIC TRIGGER: ICD-10-CM

## 2024-04-11 LAB
BASOPHILS # BLD AUTO: 0.1 10E3/UL (ref 0–0.2)
BASOPHILS NFR BLD AUTO: 1 %
EOSINOPHIL # BLD AUTO: 0.3 10E3/UL (ref 0–0.7)
EOSINOPHIL NFR BLD AUTO: 3 %
ERYTHROCYTE [DISTWIDTH] IN BLOOD BY AUTOMATED COUNT: 11.7 % (ref 10–15)
HCT VFR BLD AUTO: 47.5 % (ref 40–53)
HGB BLD-MCNC: 16.1 G/DL (ref 13.3–17.7)
IMM GRANULOCYTES # BLD: 0 10E3/UL
IMM GRANULOCYTES NFR BLD: 0 %
LYMPHOCYTES # BLD AUTO: 2.8 10E3/UL (ref 0.8–5.3)
LYMPHOCYTES NFR BLD AUTO: 34 %
MCH RBC QN AUTO: 29.5 PG (ref 26.5–33)
MCHC RBC AUTO-ENTMCNC: 33.9 G/DL (ref 31.5–36.5)
MCV RBC AUTO: 87 FL (ref 78–100)
MONOCYTES # BLD AUTO: 0.6 10E3/UL (ref 0–1.3)
MONOCYTES NFR BLD AUTO: 7 %
NEUTROPHILS # BLD AUTO: 4.7 10E3/UL (ref 1.6–8.3)
NEUTROPHILS NFR BLD AUTO: 56 %
PLATELET # BLD AUTO: 307 10E3/UL (ref 150–450)
RBC # BLD AUTO: 5.46 10E6/UL (ref 4.4–5.9)
WBC # BLD AUTO: 8.4 10E3/UL (ref 4–11)

## 2024-04-11 PROCEDURE — T1013 SIGN LANG/ORAL INTERPRETER: HCPCS | Mod: MA

## 2024-04-11 PROCEDURE — 90471 IMMUNIZATION ADMIN: CPT | Performed by: NURSE PRACTITIONER

## 2024-04-11 PROCEDURE — 99213 OFFICE O/P EST LOW 20 MIN: CPT | Mod: 25 | Performed by: NURSE PRACTITIONER

## 2024-04-11 PROCEDURE — 99395 PREV VISIT EST AGE 18-39: CPT | Mod: 25 | Performed by: NURSE PRACTITIONER

## 2024-04-11 PROCEDURE — 36415 COLL VENOUS BLD VENIPUNCTURE: CPT | Performed by: NURSE PRACTITIONER

## 2024-04-11 PROCEDURE — 80061 LIPID PANEL: CPT | Performed by: NURSE PRACTITIONER

## 2024-04-11 PROCEDURE — 80053 COMPREHEN METABOLIC PANEL: CPT | Performed by: NURSE PRACTITIONER

## 2024-04-11 PROCEDURE — 84443 ASSAY THYROID STIM HORMONE: CPT | Performed by: NURSE PRACTITIONER

## 2024-04-11 PROCEDURE — 85025 COMPLETE CBC W/AUTO DIFF WBC: CPT | Performed by: NURSE PRACTITIONER

## 2024-04-11 PROCEDURE — 90715 TDAP VACCINE 7 YRS/> IM: CPT | Performed by: NURSE PRACTITIONER

## 2024-04-11 PROCEDURE — 87389 HIV-1 AG W/HIV-1&-2 AB AG IA: CPT | Performed by: NURSE PRACTITIONER

## 2024-04-11 PROCEDURE — 86803 HEPATITIS C AB TEST: CPT | Performed by: NURSE PRACTITIONER

## 2024-04-11 RX ORDER — FLUTICASONE PROPIONATE 50 MCG
2 SPRAY, SUSPENSION (ML) NASAL DAILY
Qty: 48 G | Refills: 3 | Status: SHIPPED | OUTPATIENT
Start: 2024-04-11

## 2024-04-11 RX ORDER — FEXOFENADINE HCL 180 MG/1
180 TABLET ORAL DAILY
Qty: 90 TABLET | Refills: 0 | Status: SHIPPED | OUTPATIENT
Start: 2024-04-11 | End: 2024-10-04 | Stop reason: ALTCHOICE

## 2024-04-11 SDOH — HEALTH STABILITY: PHYSICAL HEALTH: ON AVERAGE, HOW MANY DAYS PER WEEK DO YOU ENGAGE IN MODERATE TO STRENUOUS EXERCISE (LIKE A BRISK WALK)?: 0 DAYS

## 2024-04-11 ASSESSMENT — ANXIETY QUESTIONNAIRES
7. FEELING AFRAID AS IF SOMETHING AWFUL MIGHT HAPPEN: MORE THAN HALF THE DAYS
GAD7 TOTAL SCORE: 13
1. FEELING NERVOUS, ANXIOUS, OR ON EDGE: SEVERAL DAYS
7. FEELING AFRAID AS IF SOMETHING AWFUL MIGHT HAPPEN: MORE THAN HALF THE DAYS
IF YOU CHECKED OFF ANY PROBLEMS ON THIS QUESTIONNAIRE, HOW DIFFICULT HAVE THESE PROBLEMS MADE IT FOR YOU TO DO YOUR WORK, TAKE CARE OF THINGS AT HOME, OR GET ALONG WITH OTHER PEOPLE: SOMEWHAT DIFFICULT
GAD7 TOTAL SCORE: 13
3. WORRYING TOO MUCH ABOUT DIFFERENT THINGS: MORE THAN HALF THE DAYS
2. NOT BEING ABLE TO STOP OR CONTROL WORRYING: NEARLY EVERY DAY
8. IF YOU CHECKED OFF ANY PROBLEMS, HOW DIFFICULT HAVE THESE MADE IT FOR YOU TO DO YOUR WORK, TAKE CARE OF THINGS AT HOME, OR GET ALONG WITH OTHER PEOPLE?: SOMEWHAT DIFFICULT
GAD7 TOTAL SCORE: 13
5. BEING SO RESTLESS THAT IT IS HARD TO SIT STILL: SEVERAL DAYS
4. TROUBLE RELAXING: SEVERAL DAYS
6. BECOMING EASILY ANNOYED OR IRRITABLE: NEARLY EVERY DAY

## 2024-04-11 ASSESSMENT — PATIENT HEALTH QUESTIONNAIRE - PHQ9
10. IF YOU CHECKED OFF ANY PROBLEMS, HOW DIFFICULT HAVE THESE PROBLEMS MADE IT FOR YOU TO DO YOUR WORK, TAKE CARE OF THINGS AT HOME, OR GET ALONG WITH OTHER PEOPLE: NOT DIFFICULT AT ALL
SUM OF ALL RESPONSES TO PHQ QUESTIONS 1-9: 4
SUM OF ALL RESPONSES TO PHQ QUESTIONS 1-9: 4

## 2024-04-11 ASSESSMENT — SOCIAL DETERMINANTS OF HEALTH (SDOH): HOW OFTEN DO YOU GET TOGETHER WITH FRIENDS OR RELATIVES?: NEVER

## 2024-04-11 NOTE — PROGRESS NOTES
Preventive Care Visit  [facility]  FRANCE Galvez CNP, Internal Medicine  Apr 11, 2024      Assessment & Plan     Routine general medical examination at a health care facility    - Lipid panel reflex to direct LDL Non-fasting; Future  - Comprehensive metabolic panel (BMP + Alb, Alk Phos, ALT, AST, Total. Bili, TP); Future  - TSH with free T4 reflex; Future  - CBC with platelets and differential; Future  - Lipid panel reflex to direct LDL Non-fasting  - Comprehensive metabolic panel (BMP + Alb, Alk Phos, ALT, AST, Total. Bili, TP)  - TSH with free T4 reflex  - CBC with platelets and differential    Seasonal allergic rhinitis due to other allergic trigger  Has congestion daily   Medication helps   - fexofenadine (ALLEGRA) 180 MG tablet; Take 1 tablet (180 mg) by mouth daily  - fluticasone (FLONASE) 50 MCG/ACT nasal spray; Spray 2 sprays into both nostrils daily    Screening for HIV (human immunodeficiency virus)    - HIV Antigen Antibody Combo; Future  - HIV Antigen Antibody Combo    Need for hepatitis C screening test    - Hepatitis C Screen Reflex to HCV RNA Quant and Genotype; Future  - Hepatitis C Screen Reflex to HCV RNA Quant and Genotype    Need for Tdap vaccination    - TDAP 7+ (ADACEL,BOOSTRIX)    Snoring  Willing to do   - Adult Sleep Eval & Management  Referral; Future    Acute sinusitis with symptoms > 10 days  Discussed   - amoxicillin-clavulanate (AUGMENTIN) 875-125 MG tablet; Take 1 tablet by mouth 2 times daily  - Adult ENT  Referral; Future    Nose congestion    - amoxicillin-clavulanate (AUGMENTIN) 875-125 MG tablet; Take 1 tablet by mouth 2 times daily  - Adult ENT  Referral; Future        26 minutes spent by me on the date of the encounter doing chart review, history and exam, documentation and further activities per the note      BMI  Estimated body mass index is 34.56 kg/m  as calculated from the following:    Height as of this  "encounter: 1.753 m (5' 9\").    Weight as of this encounter: 106.1 kg (234 lb).       Counseling  Appropriate preventive services were discussed with this patient, including applicable screening as appropriate for fall prevention, nutrition, physical activity, Tobacco-use cessation, weight loss and cognition.  Checklist reviewing preventive services available has been given to the patient.  Reviewed patient's diet, addressing concerns and/or questions.   Patient is at risk for social isolation and has been provided with information about the benefit of social connection.   The patient was instructed to see the dentist every 6 months.       Patient Instructions   Lab in [address]    Medication refilled     Sleep referral     Augmentin twice daily for 10 days     ENT referral     Sb Ag is a 38 year old, presenting for the following:  Physical        4/11/2024     2:10 PM   Additional Questions   Roomed by Carmela DORMAN        Health Care Directive  Patient does not have a Health Care Directive or Living Will: Discussed advance care planning with patient; however, patient declined at this time.    HPI    Breakfast this am   Not fasting     Headache in am   Snoring   Congested in am     Feels he has bad breath in the am   Plugged in am     Hurt back on ladder and sometimes has some pain in that area   Tylenol helps sometimes     Had some radiculopathy in past                    No data to display                   No data to display                   No data to display                      No data to display                            No data to display              Social History     Tobacco Use    Smoking status: Never    Smokeless tobacco: Never   Vaping Use    Vaping status: Never Used   Substance Use Topics    Alcohol use: No    Drug use: No              No data to display                 Reviewed and updated as needed this visit by Provider    Allergies                 Lab work is in process      Review " "of Systems  Constitutional, neuro, ENT, endocrine, pulmonary, cardiac, gastrointestinal, genitourinary, musculoskeletal, integument and psychiatric systems are negative, except as otherwise noted.     Objective    Exam  /78   Pulse 69   Temp 98.8  F (37.1  C) (Oral)   Resp 18   Ht 1.753 m (5' 9\")   Wt 106.1 kg (234 lb)   SpO2 98%   BMI 34.56 kg/m     Estimated body mass index is 34.56 kg/m  as calculated from the following:    Height as of this encounter: 1.753 m (5' 9\").    Weight as of this encounter: 106.1 kg (234 lb).    Physical Exam  GENERAL: alert and no distress-  in room   EYES: Eyes grossly normal to inspection,  and conjunctivae and sclerae normal  HENT: ear canals and TM's normal, nose and mouth without ulcers or lesions  NECK: no adenopathy, no asymmetry, masses, or scars  RESP: lungs clear to auscultation - no rales, rhonchi or wheezes  CV: regular rate and rhythm, normal S1 S2, no S3 or S4, no murmur, click or rub, no peripheral edema  ABDOMEN: soft, nontender, no hepatosplenomegaly, no masses and bowel sounds normal  MS: no gross musculoskeletal defects noted, no edema  SKIN: no suspicious lesions or rashes  NEURO: Normal strength and tone, mentation intact and speech normal  PSYCH: mentation appears normal, affect normal/bright        Signed Electronically by: FRANCE Galvez CNP    Answers submitted by the patient for this visit:  Patient Health Questionnaire (Submitted on 4/11/2024)  If you checked off any problems, how difficult have these problems made it for you to do your work, take care of things at home, or get along with other people?: Not difficult at all  PHQ9 TOTAL SCORE: 4  SANTOS-7 (Submitted on 4/11/2024)  SANTOS 7 TOTAL SCORE: 13    "

## 2024-04-11 NOTE — NURSING NOTE
"Chief Complaint   Patient presents with    Physical     initial /78   Pulse 69   Temp 98.8  F (37.1  C) (Oral)   Resp 18   Ht 1.753 m (5' 9\")   Wt 106.1 kg (234 lb)   SpO2 98%   BMI 34.56 kg/m   Estimated body mass index is 34.56 kg/m  as calculated from the following:    Height as of this encounter: 1.753 m (5' 9\").    Weight as of this encounter: 106.1 kg (234 lb)..  bp completed using cuff size large  TARA ADAMS LPN  "

## 2024-04-11 NOTE — COMMUNITY RESOURCES LIST (PATIENT PREFERRED LANGUAGE)
April 11, 2024           TU LISTA PERSONALIZADA DE SERVICIOS y PROGRAMAS           Deportes y Recreación    Deportes individuales/de equip[facility] - Summer Sports Camp  [address] [place] [address] (Distancia: 2.5 millas)  Teléfono: [phone]  Sitio web: [email or web address]_youth_[email or web address]  Idioma: Vikash Guevara: Auto pago      [employer] [facility] Sports clubs and recreational activities [place] [address] (Distancia: 2.5 millas)  Idioma: Vikash Guevara: Pago por cuenta propia, escala móvil      [facility] - Adult Enrichment  Teléfono: [phone]  Sitio web: [email or web address]  Idioma: Vikash Lemos: Chapis 7:30 a. m. - 4:00 p. m. Mar 7:30 a. m. - 4:00 p. m. Mié 7:30 a. m. - 4:00 p. m. Jue 7:30 a. m. - 4:00 p. m. Vie 7:30 a. m. - 4:00 p. m.    Clases/grupos de ejercicio      [facility] - [place] [address] (Distancia: 2.5 millas)  Idioma: Vikash Guevara: Lihue, pago por cuenta propia, escala móvil      [facility] training  [address] [place] [address] (Distancia: 2.5 millas)  Idioma: Vikash      [facility] - Care Coordination (Healthcare only)  Teléfono: [phone]  Sitio web: [email or web address] [email or web address]  Idioma: Ingles Español  Horas: Mié 9:00 a. m. - 11:30 a. m. Jue 13:00 - 16:00, 17:30 - 19:00               NÚMEROS Y SITIOS WEB IMPORTANTES        Servicios de emergencia  911  .   La vía unida  211 http://211unitedway.org  .   Control de veneno  [phone] http://mnpoison.org http://wisconsinpoison.org  .     Salvavidas para el suicidio y las crisis  [email or web address] Hospit[facility]ine.org  .   Línea directa nacional de abuso infantil  [facility]  [phone] http://Childhelphotline.org   .   Línea directa nacional de agresión sexual  [phone] (ROEL) http://Rainn.org   .     Línea Nacional de Seguridad para Fugitivos  [phone] (FUGA) http://BuyRentKenya.comrunaWebMarketing Group.[employer]  .   Apoyo iftikhar el embarazo y el posparto  Llame o envíe un mensaje de texto al [phone] [place]: http://ppsupportmn.org [place]: http://[employer].com/wi  .   Línea de ayuda nacional para el abuso de sustancias ([facility])  [phone] (5718) http://Findtreatment.gov   .                DESCARGO DE RESPONSABILIDAD: Estos recursos se hubbard generado a través de la plataforma [employer]. [employer] no respalda a ningún proveedor de servicios mencionado en esta lista de recursos. [employer] no garantiza que los servicios mencionados en esta lista de recursos estén disponibles para usted o mejoren moreno sara o bienestar.    [id number] [facility]

## 2024-04-11 NOTE — PATIENT INSTRUCTIONS
Lab in [address]    Medication refilled     Sleep referral     Augmentin twice daily for 10 days     ENT referral

## 2024-04-11 NOTE — COMMUNITY RESOURCES LIST (ENGLISH)
April 11, 2024           YOUR PERSONALIZED LIST OF SERVICES & PROGRAMS           SPORTS & RECREATION    Individual/Team Sports      [facility] - Summer Sports Camp  [id number] [place] [address] (Distance: 2.5 miles)  Phone: [phone]  Website: [email or web address]  Language: English  Fee: Self pay      [facility] - Sports clubs and recreational activities - [facility]  [id number] [place] [address] (Distance: 2.5 miles)  Language: English  Fee: Self pay, Sliding scale      [facility] - Adult Enrichment  Phone: [phone]  Website: [email or web address]  Language: English  Hours: Mon 7:30 AM - 4:00 PM Tue 7:30 AM - 4:00 PM Wed 7:30 AM - 4:00 PM Thu 7:30 AM - 4:00 PM Fri 7:30 AM - 4:00 PM    Exercise Classes/Groups      [facility] [place] [address] (Distance: 2.5 miles)  Language: English  Fee: Free, Self pay, Sliding scale      [facility] Personal training  [address] [place] [address] (Distance: 2.5 miles)  Language: English      [facility] - Care Coordination (Healthcare only)  Phone: [phone]  Website: [email or web address]  Language: English, Kyrgyz  Hours: Wed 9:00 AM - 11:30 AM Thu 1:00 PM - 4:00 PM, 5:30 PM - 7:00 PM               IMPORTANT NUMBERS & WEBSITES        Emergency Services  911  .   United Way  211 http://211unitedway.org  .   Poison Control  (935) 940-7826 http://[place]poison.org http://wis[place]poison.org  .     Suicide and Crisis Lifeline  988 http://988lifeline.org  .   Childhelp [place] Child Abuse Hotline  401.996.5761 http://Childhelphotline.org   .   National Sexual Assault Hotline  (644) 363-1807 (HOPE) http://Rainn.org   .     National  Runaway Safeline  (446) 368-7324 (RUNAWAY) http://[employer].org  .   Pregnancy & Postpartum Support  Call/text 803-370-8969  [place]: http://ppsupportmn.org  [place]: http://[employer].com/wi  .   Substance Abuse National Helpline ([facility])  457-152-HELP (8612) http://Findtreatment.gov   .                DISCLAIMER: These resources have been generated via the [employer] Platform. [employer] does not endorse any service providers mentioned in this resource list. [employer] does not guarantee that the services mentioned in this resource list will be available to you or will improve your health or wellness.    [id number] [facility]

## 2024-04-12 LAB
ALBUMIN SERPL BCG-MCNC: 4.8 G/DL (ref 3.5–5.2)
ALP SERPL-CCNC: 90 U/L (ref 40–150)
ALT SERPL W P-5'-P-CCNC: 50 U/L (ref 0–70)
ANION GAP SERPL CALCULATED.3IONS-SCNC: 11 MMOL/L (ref 7–15)
AST SERPL W P-5'-P-CCNC: 35 U/L (ref 0–45)
BILIRUB SERPL-MCNC: 0.5 MG/DL
BUN SERPL-MCNC: 12 MG/DL (ref 6–20)
CALCIUM SERPL-MCNC: 9.6 MG/DL (ref 8.6–10)
CHLORIDE SERPL-SCNC: 100 MMOL/L (ref 98–107)
CHOLEST SERPL-MCNC: 192 MG/DL
CREAT SERPL-MCNC: 0.78 MG/DL (ref 0.67–1.17)
DEPRECATED HCO3 PLAS-SCNC: 26 MMOL/L (ref 22–29)
EGFRCR SERPLBLD CKD-EPI 2021: >90 ML/MIN/1.73M2
FASTING STATUS PATIENT QL REPORTED: NO
GLUCOSE SERPL-MCNC: 94 MG/DL (ref 70–99)
HCV AB SERPL QL IA: NONREACTIVE
HDLC SERPL-MCNC: 32 MG/DL
HIV 1+2 AB+HIV1 P24 AG SERPL QL IA: NONREACTIVE
LDLC SERPL CALC-MCNC: 133 MG/DL
NONHDLC SERPL-MCNC: 160 MG/DL
POTASSIUM SERPL-SCNC: 4.5 MMOL/L (ref 3.4–5.3)
PROT SERPL-MCNC: 8.4 G/DL (ref 6.4–8.3)
SODIUM SERPL-SCNC: 137 MMOL/L (ref 135–145)
TRIGL SERPL-MCNC: 135 MG/DL
TSH SERPL DL<=0.005 MIU/L-ACNC: 1.49 UIU/ML (ref 0.3–4.2)

## 2024-08-05 ENCOUNTER — HOSPITAL ENCOUNTER (EMERGENCY)
Facility: CLINIC | Age: 39
Discharge: HOME | End: 2024-08-05
Attending: EMERGENCY MEDICINE | Admitting: EMERGENCY MEDICINE
Payer: COMMERCIAL

## 2024-08-05 ENCOUNTER — APPOINTMENT (OUTPATIENT)
Dept: GENERAL RADIOLOGY | Facility: CLINIC | Age: 39
End: 2024-08-05
Attending: EMERGENCY MEDICINE
Payer: COMMERCIAL

## 2024-08-05 VITALS
DIASTOLIC BLOOD PRESSURE: 78 MMHG | BODY MASS INDEX: 32.04 KG/M2 | RESPIRATION RATE: 18 BRPM | TEMPERATURE: 96.9 F | OXYGEN SATURATION: 99 % | WEIGHT: 216.93 LBS | SYSTOLIC BLOOD PRESSURE: 142 MMHG | HEART RATE: 50 BPM

## 2024-08-05 DIAGNOSIS — R07.89 ATYPICAL CHEST PAIN: ICD-10-CM

## 2024-08-05 LAB
ANION GAP SERPL CALCULATED.3IONS-SCNC: 12 MMOL/L (ref 7–15)
ATRIAL RATE - MUSE: 51 BPM
BASOPHILS # BLD AUTO: 0.1 10E3/UL (ref 0–0.2)
BASOPHILS NFR BLD AUTO: 1 %
BUN SERPL-MCNC: 12.1 MG/DL (ref 6–20)
CALCIUM SERPL-MCNC: 10.1 MG/DL (ref 8.8–10.4)
CHLORIDE SERPL-SCNC: 99 MMOL/L (ref 98–107)
CREAT SERPL-MCNC: 0.79 MG/DL (ref 0.67–1.17)
DIASTOLIC BLOOD PRESSURE - MUSE: NORMAL MMHG
EGFRCR SERPLBLD CKD-EPI 2021: >90 ML/MIN/1.73M2
EOSINOPHIL # BLD AUTO: 0.2 10E3/UL (ref 0–0.7)
EOSINOPHIL NFR BLD AUTO: 2 %
ERYTHROCYTE [DISTWIDTH] IN BLOOD BY AUTOMATED COUNT: 11.9 % (ref 10–15)
FLUAV RNA SPEC QL NAA+PROBE: NEGATIVE
FLUBV RNA RESP QL NAA+PROBE: NEGATIVE
GLUCOSE SERPL-MCNC: 91 MG/DL (ref 70–99)
HCO3 SERPL-SCNC: 27 MMOL/L (ref 22–29)
HCT VFR BLD AUTO: 47.3 % (ref 40–53)
HGB BLD-MCNC: 15.9 G/DL (ref 13.3–17.7)
HOLD SPECIMEN: NORMAL
HOLD SPECIMEN: NORMAL
IMM GRANULOCYTES # BLD: 0 10E3/UL
IMM GRANULOCYTES NFR BLD: 0 %
INTERPRETATION ECG - MUSE: NORMAL
LYMPHOCYTES # BLD AUTO: 3.1 10E3/UL (ref 0.8–5.3)
LYMPHOCYTES NFR BLD AUTO: 35 %
MCH RBC QN AUTO: 29.8 PG (ref 26.5–33)
MCHC RBC AUTO-ENTMCNC: 33.6 G/DL (ref 31.5–36.5)
MCV RBC AUTO: 89 FL (ref 78–100)
MONOCYTES # BLD AUTO: 0.6 10E3/UL (ref 0–1.3)
MONOCYTES NFR BLD AUTO: 7 %
NEUTROPHILS # BLD AUTO: 4.9 10E3/UL (ref 1.6–8.3)
NEUTROPHILS NFR BLD AUTO: 55 %
NRBC # BLD AUTO: 0 10E3/UL
NRBC BLD AUTO-RTO: 0 /100
P AXIS - MUSE: 48 DEGREES
PLATELET # BLD AUTO: 325 10E3/UL (ref 150–450)
POTASSIUM SERPL-SCNC: 4.2 MMOL/L (ref 3.4–5.3)
PR INTERVAL - MUSE: 144 MS
QRS DURATION - MUSE: 92 MS
QT - MUSE: 382 MS
QTC - MUSE: 352 MS
R AXIS - MUSE: 50 DEGREES
RBC # BLD AUTO: 5.34 10E6/UL (ref 4.4–5.9)
RSV RNA SPEC NAA+PROBE: NEGATIVE
SARS-COV-2 RNA RESP QL NAA+PROBE: NEGATIVE
SODIUM SERPL-SCNC: 138 MMOL/L (ref 135–145)
SYSTOLIC BLOOD PRESSURE - MUSE: NORMAL MMHG
T AXIS - MUSE: 14 DEGREES
TROPONIN T SERPL HS-MCNC: 9 NG/L
VENTRICULAR RATE- MUSE: 51 BPM
WBC # BLD AUTO: 8.9 10E3/UL (ref 4–11)

## 2024-08-05 PROCEDURE — 85025 COMPLETE CBC W/AUTO DIFF WBC: CPT | Performed by: EMERGENCY MEDICINE

## 2024-08-05 PROCEDURE — 84484 ASSAY OF TROPONIN QUANT: CPT | Performed by: EMERGENCY MEDICINE

## 2024-08-05 PROCEDURE — 87637 SARSCOV2&INF A&B&RSV AMP PRB: CPT | Performed by: EMERGENCY MEDICINE

## 2024-08-05 PROCEDURE — 99285 EMERGENCY DEPT VISIT HI MDM: CPT | Mod: 25

## 2024-08-05 PROCEDURE — 36415 COLL VENOUS BLD VENIPUNCTURE: CPT | Performed by: EMERGENCY MEDICINE

## 2024-08-05 PROCEDURE — 93005 ELECTROCARDIOGRAM TRACING: CPT

## 2024-08-05 PROCEDURE — 71046 X-RAY EXAM CHEST 2 VIEWS: CPT

## 2024-08-05 PROCEDURE — 80048 BASIC METABOLIC PNL TOTAL CA: CPT | Performed by: EMERGENCY MEDICINE

## 2024-08-05 ASSESSMENT — COLUMBIA-SUICIDE SEVERITY RATING SCALE - C-SSRS
1. IN THE PAST MONTH, HAVE YOU WISHED YOU WERE DEAD OR WISHED YOU COULD GO TO SLEEP AND NOT WAKE UP?: NO
6. HAVE YOU EVER DONE ANYTHING, STARTED TO DO ANYTHING, OR PREPARED TO DO ANYTHING TO END YOUR LIFE?: NO
2. HAVE YOU ACTUALLY HAD ANY THOUGHTS OF KILLING YOURSELF IN THE PAST MONTH?: NO

## 2024-08-05 ASSESSMENT — ACTIVITIES OF DAILY LIVING (ADL)
ADLS_ACUITY_SCORE: 35
ADLS_ACUITY_SCORE: 35
ADLS_ACUITY_SCORE: 33

## 2024-08-05 NOTE — ED PROVIDER NOTES
Emergency Department Note      History of Present Illness   Chief Complaint   Chest Pain and Shortness of Breath    TRISTEN James is a 39 year old male who presents with a family member for an evaluation of chest pain and shortness of breath. The patients family member stated they were at the store when he began to have a short, dry cough and a pressure in his chest. She added he also had a difficult time breathing and looked pale. She stated he feels like something is stuck in his throat despite not recently eaten when that symptom began. She stated he is also fatigue and having headaches today. She stated he has a history of anxiety. She stated he has a history of high cholesterol and has changed his diet and lost weight recently. He has been exercising. No exertional chest pain. No pleuritic chest pain. The patient stated his chest is sore on the left side. He denies numbness or tingling, fever, nausea, vomiting, leg pain or swelling, sore throat, pain with deep breaths or abdominal pain. He noted having a history of acid reflux and it depends on what he eats.     Independent Historian   Patients wife provides additional history as included above.    Review of External Notes   None  Past Medical History   Medical History and Problem List   Peripheral polyneuropathy     Medications   Fexofenadine   Physical Exam   Patient Vitals for the past 24 hrs:   BP Temp Temp src Pulse Resp SpO2 Weight   08/05/24 1936 (!) 142/78 -- -- 50 18 99 % --   08/05/24 1710 -- -- -- -- -- 99 % --   08/05/24 1700 126/80 -- -- 50 -- -- --   08/05/24 1452 138/78 96.9  F (36.1  C) Temporal 54 18 100 % 98.4 kg (216 lb 14.9 oz)     Physical Exam  General: Well appearing, nontoxic.  Resting comfortably  Head:  Scalp, face, and head appear normal  Eyes:  Pupils are equal, round    Conjunctivae non-injected and sclerae white  ENT:    The external nose is normal    Pinnae are normal. MMM. Posterior pharynx clear without  swelling, exudates or erythema. No mucosal lesions, tongue or lip swelling. No tongue elevation. Uvula normal without deviation. Voice normal. No drooling or trismus.   Neck:  Normal range of motion    There is no rigidity noted    Trachea is in the midline  CV:  Regular rate and rhythm     Normal S1/S2, no S3/S4    No murmur or rub. Radial pulses 2+ bilaterally.  Resp:  Lungs are clear and equal bilaterally  There is no tachypnea    No increased work of breathing    No rales, wheezing, or rhonchi  GI:  Abdomen is soft, no rigidity or guarding    No distension, or mass    No tenderness or rebound tenderness   MS:  Normal muscular tone    Symmetric motor strength    No lower extremity edema. No calf swelling or tenderness.  Skin:  No rash or acute skin lesions noted  Neuro:  Awake and alert  Speech is normal and fluent  Moves all extremities spontaneously  Psych: Normal affect. Appropriate interactions.  Diagnostics   Lab Results   Labs Ordered and Resulted from Time of ED Arrival to Time of ED Departure   BASIC METABOLIC PANEL - Normal       Result Value    Sodium 138      Potassium 4.2      Chloride 99      Carbon Dioxide (CO2) 27      Anion Gap 12      Urea Nitrogen 12.1      Creatinine 0.79      GFR Estimate >90      Calcium 10.1      Glucose 91     TROPONIN T, HIGH SENSITIVITY - Normal    Troponin T, High Sensitivity 9     INFLUENZA A/B, RSV, & SARS-COV2 PCR - Normal    Influenza A PCR Negative      Influenza B PCR Negative      RSV PCR Negative      SARS CoV2 PCR Negative     CBC WITH PLATELETS AND DIFFERENTIAL    WBC Count 8.9      RBC Count 5.34      Hemoglobin 15.9      Hematocrit 47.3      MCV 89      MCH 29.8      MCHC 33.6      RDW 11.9      Platelet Count 325      % Neutrophils 55      % Lymphocytes 35      % Monocytes 7      % Eosinophils 2      % Basophils 1      % Immature Granulocytes 0      NRBCs per 100 WBC 0      Absolute Neutrophils 4.9      Absolute Lymphocytes 3.1      Absolute Monocytes 0.6       Absolute Eosinophils 0.2      Absolute Basophils 0.1      Absolute Immature Granulocytes 0.0      Absolute NRBCs 0.0       Imaging   Chest XR,  PA & LAT   Final Result   IMPRESSION:  There are no acute infiltrates. The cardiac silhouette is   not enlarged. Pulmonary vasculature is unremarkable.       COREY JACKSON MD            SYSTEM ID:  [id number]        EKG   ECG taken at 1500, ECG read at 1505  Sinus bradycardia    Rate 51 bpm. CO interval 144 ms. QRS duration 92 ms. QT/QTc 382/352 ms. P-R-T axes 48 50 14.    Independent Interpretation   See below     ED Course    Medications Administered   None     Procedures   None      Discussion of Management   None    ED Course   ED Course as of 08/06/24 1154   Mon Aug 05, 2024   1729 I obtained history and examined the patient as noted above.    1848 On my independent interpretation of the patient's chest radiograph(s) there is no pneumothorax or large pleural effusions.    1926 I rechecked and updated the patient. The patient is safe for discharge.      Additional Documentation  None  Medical Decision Making / Diagnosis   CMS Diagnoses: None    MIPS       None    [facility]   Ancelmo James is a 39 year old male who presents for evaluation of chest pain.  Pain is not exertional mild and nonpleuritic.  On my evaluation he is well-appearing, hemodynamically stable.  A broad differential diagnosis is considered including acute coronary syndrome, pulmonary embolism, pleurisy, pericarditis, myocarditis, pneumonia, pneumothorax, pleural effusion, pulmonary edema, thoracic mass, among others.  Patient denies any trauma.  Patient is low risk for ACS. Work-up in the ED is thankfully reassuring.  EKG does not show any acute ischemic changes and is without any evidence for dysrhythmia.  Clinical signs and symptoms are not consistent with pulmonary embolism he has no tachycardia, hypoxia or pleuritic pain.  Chest x-ray was obtained and was unremarkable without any evidence of any  of the above pathologies.  Troponin is negative.  The patient's symptoms are not consistent with a serious underlying primary cardiopulmonary process or other serious etiology. No evidence to suggest acute aortic emergency. It is felt most likely to be musculoskeletal vs GERD. Findings and plan of care were discussed with the patient.  Patient is agreeable to plan of care.  I feel that based on the ED evaluation and reassuring findings the patient is safe for discharge and close outpatient follow-up.  Close return precautions were provided to the patient.  he should return immediately or present to the closest ED or call 911 for any worsening. The patient was agreeable to plan of care and he was discharged in stable condition.      Disposition   The patient was discharged.     Diagnosis     ICD-10-CM    1. Atypical chest pain  R07.89            Discharge Medications   Discharge Medication List as of 8/5/2024  7:33 PM        Scribe Disclosure:  I, Eliana Lenz, am serving as a scribe at 7:07 PM on 8/5/2024 to document services personally performed by Kaleb Issa MD based on my observations and the provider's statements to me.      Kaleb Issa MD  08/06/24 3377

## 2024-08-17 ENCOUNTER — HOSPITAL ENCOUNTER (EMERGENCY)
Facility: CLINIC | Age: 39
Discharge: HOME | End: 2024-08-17
Attending: EMERGENCY MEDICINE | Admitting: EMERGENCY MEDICINE
Payer: COMMERCIAL

## 2024-08-17 VITALS
BODY MASS INDEX: 35.29 KG/M2 | OXYGEN SATURATION: 100 % | HEIGHT: 66 IN | SYSTOLIC BLOOD PRESSURE: 138 MMHG | DIASTOLIC BLOOD PRESSURE: 87 MMHG | WEIGHT: 219.58 LBS | RESPIRATION RATE: 18 BRPM | TEMPERATURE: 97.1 F | HEART RATE: 65 BPM

## 2024-08-17 DIAGNOSIS — M26.621 TMJ TENDERNESS, RIGHT: ICD-10-CM

## 2024-08-17 PROCEDURE — 99283 EMERGENCY DEPT VISIT LOW MDM: CPT

## 2024-08-17 PROCEDURE — 250N000013 HC RX MED GY IP 250 OP 250 PS 637: Performed by: EMERGENCY MEDICINE

## 2024-08-17 RX ORDER — NAPROXEN 250 MG/1
500 TABLET ORAL ONCE
Status: COMPLETED | OUTPATIENT
Start: 2024-08-17 | End: 2024-08-17

## 2024-08-17 RX ORDER — NAPROXEN 500 MG/1
500 TABLET ORAL 2 TIMES DAILY WITH MEALS
Qty: 14 TABLET | Refills: 0 | Status: SHIPPED | OUTPATIENT
Start: 2024-08-17 | End: 2024-08-24

## 2024-08-17 RX ADMIN — NAPROXEN 500 MG: 250 TABLET ORAL at 02:05

## 2024-08-17 ASSESSMENT — COLUMBIA-SUICIDE SEVERITY RATING SCALE - C-SSRS
2. HAVE YOU ACTUALLY HAD ANY THOUGHTS OF KILLING YOURSELF IN THE PAST MONTH?: NO
6. HAVE YOU EVER DONE ANYTHING, STARTED TO DO ANYTHING, OR PREPARED TO DO ANYTHING TO END YOUR LIFE?: NO
1. IN THE PAST MONTH, HAVE YOU WISHED YOU WERE DEAD OR WISHED YOU COULD GO TO SLEEP AND NOT WAKE UP?: NO

## 2024-08-17 ASSESSMENT — ACTIVITIES OF DAILY LIVING (ADL)
ADLS_ACUITY_SCORE: 35
ADLS_ACUITY_SCORE: 33

## 2024-08-17 NOTE — ED TRIAGE NOTES
Pt. Presents to ED with complaints of two days of R sided facial pain near his ear an cheek that worsens with biting, chewing and opening his mouth. Pt. Taking tylenol with minimal relief. Denies dental pain. AVSS on RA. A & O x 4, independent. Reports he was seen here about a week ago for a fall on his chest and the pain started a couple days after that.

## 2024-08-17 NOTE — ED PROVIDER NOTES
"  Emergency Department Note      History of Present Illness     Chief Complaint  Facial Pain    HPI  Ancelmo James is a 39 year old male who presents to the ED with chief complaint right facial pain.  Symptoms have been ongoing for the last few days.  Pain is worse with biting/chewing.  He denies any tooth pain.  He reports the pain radiates into his temple.  He denies similar complaints in the past.      Independent Historian  Wife at bedside, she reports patient has been very stressed and quick to anger recently.  She also believes he occasionally grinds his teeth at night.    Review of External Notes  None  Past Medical History   Medical History and Problem List  Peripheral polyneuropathy    Medications  Augmentin  Allegra     Surgical History   No past surgical history on file.  Physical Exam   Patient Vitals for the past 24 hrs:   BP Temp Temp src Pulse Resp SpO2 Height Weight   08/17/24 0023 138/87 -- -- 65 18 100 % -- --   08/17/24 0021 -- 97.1  F (36.2  C) Temporal -- -- 100 % 1.676 m (5' 6\") 99.6 kg (219 lb 9.3 oz)     Physical Exam    Head:  The scalp, face, and head appear normal  Eyes:  Conjunctivae are normal  ENT:    Pain to palpation right TMJ, right masseter, and right temporalis muscle.  No pain to palpation of teeth.  No periapical abscess.  No trismus.  Neck:  Trachea midline  CV:  Normal rate  Resp:  No respiratory distress   Musc:  Normal muscular tone  Skin:  No rash or lesions noted  Neuro: Speech is normal and fluent. Face is symmetric. Moving all extremities well.             Diagnostics   Lab Results   Labs Ordered and Resulted from Time of ED Arrival to Time of ED Departure - No data to display        Independent Interpretation  None  ED Course    Medications Administered  Medications   naproxen (NAPROSYN) tablet 500 mg (500 mg Oral $Given 8/17/24 3213)         Discussion of Management  None    Optional/Additional Documentation  None      Medical Decision Making / Diagnosis   CMS " Diagnoses: None    MIPS     None    [facility]  Ancelmo James is a 39 year old male who presents for evaluation of right-sided jaw pain. This is consistent by history and physical exam with TMJ syndrome.  Discussed treatment of this and need to see dentist and/or physical therapy. Soft diet until then.  Doubt other serious underlying etiologies for his facial pain including ACS, dental abscess, sialoadenitis, shingles. He is in stable condition at the time of discharge, red flags that should merit ED return were discussed as well as recommended follow-up instructions. All questions were answered and he and his wife are in agreement with the plan.        Disposition  The patient was discharged.     ICD-10 Codes:    ICD-10-CM    1. TMJ tenderness, right  M26.621            Discharge Medications  Discharge Medication List as of 8/17/2024  2:00 AM        START taking these medications    Details   naproxen (NAPROSYN) 500 MG tablet Take 1 tablet (500 mg) by mouth 2 times daily (with meals) for 7 days, Disp-14 tablet, R-0, E-Prescribe                Yovani Gaxiola MD  08/19/24 1995

## 2024-08-20 ENCOUNTER — APPOINTMENT (OUTPATIENT)
Dept: INTERPRETER SERVICES | Facility: CLINIC | Age: 39
End: 2024-08-20
Payer: COMMERCIAL

## 2024-09-18 ENCOUNTER — HOSPITAL ENCOUNTER (EMERGENCY)
Facility: CLINIC | Age: 39
Discharge: HOME | End: 2024-09-19
Attending: EMERGENCY MEDICINE
Payer: COMMERCIAL

## 2024-09-18 ENCOUNTER — APPOINTMENT (OUTPATIENT)
Dept: CT IMAGING | Facility: CLINIC | Age: 39
End: 2024-09-18
Attending: EMERGENCY MEDICINE
Payer: COMMERCIAL

## 2024-09-18 VITALS
WEIGHT: 215.83 LBS | HEART RATE: 71 BPM | BODY MASS INDEX: 34.84 KG/M2 | TEMPERATURE: 97.7 F | SYSTOLIC BLOOD PRESSURE: 150 MMHG | OXYGEN SATURATION: 96 % | RESPIRATION RATE: 18 BRPM | DIASTOLIC BLOOD PRESSURE: 81 MMHG

## 2024-09-18 DIAGNOSIS — K12.2: ICD-10-CM

## 2024-09-18 LAB
ALBUMIN SERPL BCG-MCNC: 4.9 G/DL (ref 3.5–5.2)
ALP SERPL-CCNC: 100 U/L (ref 40–150)
ALT SERPL W P-5'-P-CCNC: 28 U/L (ref 0–70)
ANION GAP SERPL CALCULATED.3IONS-SCNC: 12 MMOL/L (ref 7–15)
AST SERPL W P-5'-P-CCNC: 26 U/L (ref 0–45)
BASOPHILS # BLD AUTO: 0.1 10E3/UL (ref 0–0.2)
BASOPHILS NFR BLD AUTO: 1 %
BILIRUB SERPL-MCNC: 0.4 MG/DL
BUN SERPL-MCNC: 16.1 MG/DL (ref 6–20)
CALCIUM SERPL-MCNC: 9.8 MG/DL (ref 8.8–10.4)
CHLORIDE SERPL-SCNC: 101 MMOL/L (ref 98–107)
CREAT SERPL-MCNC: 0.99 MG/DL (ref 0.67–1.17)
CRP SERPL-MCNC: <3 MG/L
EGFRCR SERPLBLD CKD-EPI 2021: >90 ML/MIN/1.73M2
EOSINOPHIL # BLD AUTO: 0.3 10E3/UL (ref 0–0.7)
EOSINOPHIL NFR BLD AUTO: 2 %
ERYTHROCYTE [DISTWIDTH] IN BLOOD BY AUTOMATED COUNT: 11.7 % (ref 10–15)
GLUCOSE SERPL-MCNC: 119 MG/DL (ref 70–99)
HCO3 SERPL-SCNC: 26 MMOL/L (ref 22–29)
HCT VFR BLD AUTO: 46.1 % (ref 40–53)
HGB BLD-MCNC: 15.4 G/DL (ref 13.3–17.7)
HOLD SPECIMEN: NORMAL
HOLD SPECIMEN: NORMAL
IMM GRANULOCYTES # BLD: 0 10E3/UL
IMM GRANULOCYTES NFR BLD: 0 %
LYMPHOCYTES # BLD AUTO: 2.7 10E3/UL (ref 0.8–5.3)
LYMPHOCYTES NFR BLD AUTO: 25 %
MCH RBC QN AUTO: 29.5 PG (ref 26.5–33)
MCHC RBC AUTO-ENTMCNC: 33.4 G/DL (ref 31.5–36.5)
MCV RBC AUTO: 88 FL (ref 78–100)
MONOCYTES # BLD AUTO: 0.6 10E3/UL (ref 0–1.3)
MONOCYTES NFR BLD AUTO: 6 %
NEUTROPHILS # BLD AUTO: 7.3 10E3/UL (ref 1.6–8.3)
NEUTROPHILS NFR BLD AUTO: 66 %
NRBC # BLD AUTO: 0 10E3/UL
NRBC BLD AUTO-RTO: 0 /100
PLATELET # BLD AUTO: 327 10E3/UL (ref 150–450)
POTASSIUM SERPL-SCNC: 4.8 MMOL/L (ref 3.4–5.3)
PROT SERPL-MCNC: 8.3 G/DL (ref 6.4–8.3)
RBC # BLD AUTO: 5.22 10E6/UL (ref 4.4–5.9)
SODIUM SERPL-SCNC: 139 MMOL/L (ref 135–145)
WBC # BLD AUTO: 11 10E3/UL (ref 4–11)

## 2024-09-18 PROCEDURE — 85025 COMPLETE CBC W/AUTO DIFF WBC: CPT | Performed by: EMERGENCY MEDICINE

## 2024-09-18 PROCEDURE — 96361 HYDRATE IV INFUSION ADD-ON: CPT

## 2024-09-18 PROCEDURE — 86140 C-REACTIVE PROTEIN: CPT | Performed by: EMERGENCY MEDICINE

## 2024-09-18 PROCEDURE — 258N000003 HC RX IP 258 OP 636: Performed by: EMERGENCY MEDICINE

## 2024-09-18 PROCEDURE — 99285 EMERGENCY DEPT VISIT HI MDM: CPT | Mod: 25

## 2024-09-18 PROCEDURE — 70491 CT SOFT TISSUE NECK W/DYE: CPT

## 2024-09-18 PROCEDURE — 80053 COMPREHEN METABOLIC PANEL: CPT | Performed by: EMERGENCY MEDICINE

## 2024-09-18 PROCEDURE — 36415 COLL VENOUS BLD VENIPUNCTURE: CPT | Performed by: EMERGENCY MEDICINE

## 2024-09-18 PROCEDURE — 96360 HYDRATION IV INFUSION INIT: CPT | Mod: 59

## 2024-09-18 RX ADMIN — SODIUM CHLORIDE 1000 ML: 9 INJECTION, SOLUTION INTRAVENOUS at 23:53

## 2024-09-18 ASSESSMENT — ACTIVITIES OF DAILY LIVING (ADL): ADLS_ACUITY_SCORE: 33

## 2024-09-19 PROCEDURE — 96375 TX/PRO/DX INJ NEW DRUG ADDON: CPT

## 2024-09-19 PROCEDURE — 250N000011 HC RX IP 250 OP 636: Performed by: EMERGENCY MEDICINE

## 2024-09-19 PROCEDURE — 96365 THER/PROPH/DIAG IV INF INIT: CPT

## 2024-09-19 RX ORDER — DEXAMETHASONE SODIUM PHOSPHATE 10 MG/ML
10 INJECTION, SOLUTION INTRAMUSCULAR; INTRAVENOUS ONCE
Status: COMPLETED | OUTPATIENT
Start: 2024-09-19 | End: 2024-09-19

## 2024-09-19 RX ORDER — IOPAMIDOL 755 MG/ML
500 INJECTION, SOLUTION INTRAVASCULAR ONCE
Status: COMPLETED | OUTPATIENT
Start: 2024-09-19 | End: 2024-09-19

## 2024-09-19 RX ORDER — AMPICILLIN AND SULBACTAM 2; 1 G/1; G/1
3 INJECTION, POWDER, FOR SOLUTION INTRAMUSCULAR; INTRAVENOUS ONCE
Status: COMPLETED | OUTPATIENT
Start: 2024-09-19 | End: 2024-09-19

## 2024-09-19 RX ADMIN — AMPICILLIN SODIUM AND SULBACTAM SODIUM 3 G: 2; 1 INJECTION, POWDER, FOR SOLUTION INTRAMUSCULAR; INTRAVENOUS at 00:46

## 2024-09-19 RX ADMIN — DEXAMETHASONE SODIUM PHOSPHATE 10 MG: 10 INJECTION, SOLUTION INTRAMUSCULAR; INTRAVENOUS at 00:46

## 2024-09-19 RX ADMIN — IOPAMIDOL 90 ML: 755 INJECTION, SOLUTION INTRAVENOUS at 00:08

## 2024-09-19 ASSESSMENT — ACTIVITIES OF DAILY LIVING (ADL): ADLS_ACUITY_SCORE: 35

## 2024-09-19 NOTE — DISCHARGE INSTRUCTIONS
You were seen today for swelling under your chin.  Your workup and imaging was reassuring but as discussed I am concerned about the location of this swelling.  You were started on antibiotics.  This does have potential develop into a serious infection despite antibiotic administration.  As discussed if the swelling is getting worse we need you to return to the emergency department for repeat evaluation.

## 2024-09-19 NOTE — ED TRIAGE NOTES
Pt to ER w c/o facial swelling. Pt states a couple days ago he felt a bump on L side of neck, now the whole underside of chin swollen. Maintaining secretion and no difficulty swallowing. States it hurts when he moves his head or touches the site. Rates pain 6/10. VSS, ABCs intact, A&Ox4.

## 2024-09-19 NOTE — ED PROVIDER NOTES
Emergency Department Note      History of Present Illness     Chief Complaint   Facial Swelling    TRISTEN James is a 39 year old male with history of seasonal allergic rhinitis and peripheral polyneuropathy who presents to the ED with a family member for evaluation of facial and neck swelling. The patient felt some throat swelling a few days ago that acutely worsened this morning. Family member states Ancelmo pressed his cheek and felt pain in his throat. Patient does not feel like his tongue is pushing up. No associated fevers, difficulty swallowing, or current antibiotics. Patient was seen last month for jaw pain/TMJ tenderness and did not have this throat swelling at the time.      Independent Historian   Family member present as detailed above.    Review of External Notes   I reviewed Care Everywhere and updated Epic.    Past Medical History     Medical History and Problem List   Past Medical History:   Diagnosis Date    Seasonal allergic rhinitis      Medications   fexofenadine (ALLEGRA) 180 MG tablet  fluticasone (FLONASE) 50 MCG/ACT nasal spray    Surgical History   No past surgical history on file.    Physical Exam     Patient Vitals for the past 24 hrs:   BP Temp Temp src Pulse Resp SpO2 Weight   09/18/24 2011 150/81 97.7  F (36.5  C) Temporal 71 18 96 % --   09/18/24 2008 -- -- -- -- -- -- 97.9 kg (215 lb 13.3 oz)     Physical Exam  Nursing note and vitals reviewed.  Constitutional: Cooperative.   HENT:   Mouth/Throat: Mucous membranes are normal. Central submental swelling.  No warmth or erythema.  No induration or crepitus.  No dental tenderness, no trismus.  Tongue is normal caliber.  Tolerating secretions well.  No cervical lymphadenopathy.  Cardiovascular: Normal rate, regular rhythm and normal heart sounds.  No murmur.  Pulmonary/Chest: Effort normal and breath sounds normal. No respiratory distress. No wheezes.   Neurological: Alert.  Oriented x 3  Skin: Skin is warm and dry. No  rash noted on neck.   Psychiatric: Normal mood and affect.      Diagnostics     Lab Results   Labs Ordered and Resulted from Time of ED Arrival to Time of ED Departure   COMPREHENSIVE METABOLIC PANEL - Abnormal       Result Value    Sodium 139      Potassium 4.8      Carbon Dioxide (CO2) 26      Anion Gap 12      Urea Nitrogen 16.1      Creatinine 0.99      GFR Estimate >90      Calcium 9.8      Chloride 101      Glucose 119 (*)     Alkaline Phosphatase 100      AST 26      ALT 28      Protein Total 8.3      Albumin 4.9      Bilirubin Total 0.4     CRP INFLAMMATION - Normal    CRP Inflammation <3.00     CBC WITH PLATELETS AND DIFFERENTIAL    WBC Count 11.0      RBC Count 5.22      Hemoglobin 15.4      Hematocrit 46.1      MCV 88      MCH 29.5      MCHC 33.4      RDW 11.7      Platelet Count 327      % Neutrophils 66      % Lymphocytes 25      % Monocytes 6      % Eosinophils 2      % Basophils 1      % Immature Granulocytes 0      NRBCs per 100 WBC 0      Absolute Neutrophils 7.3      Absolute Lymphocytes 2.7      Absolute Monocytes 0.6      Absolute Eosinophils 0.3      Absolute Basophils 0.1      Absolute Immature Granulocytes 0.0      Absolute NRBCs 0.0         Imaging   Soft tissue neck CT w contrast   Final Result   IMPRESSION:    1.  Mild submental edema. No abscess. No subcutaneous gas.          Independent Interpretation   None    ED Course      Medications Administered   Medications   sodium chloride 0.9% BOLUS 1,000 mL (1,000 mLs Intravenous $New Bag 9/18/24 2350)   dexAMETHasone PF (DECADRON) injection 10 mg (has no administration in time range)   ampicillin-sulbactam (UNASYN) 3 g vial to attach to  mL bag (has no administration in time range)     Discussion of Management   None    ED Course   ED Course as of 09/19/24 0032   Wed Sep 18, 2024   2304 I obtained history and examined the patient as noted above.    Thu Sep 19, 2024   0028 I rechecked the patient and explained findings.      Additional  Documentation  None    Medical Decision Making / Diagnosis     LOURDES James is a 39 year old male who presents to the emergency department with a localized area of swelling in the submental region.  There is no warmth, erythema, induration or crepitus.  It is not appear externally to be infectious though the location is worrisome.  No evidence of Ludewig's angina or abscess on CT scan.  There is some nonspecific edema in that location.  His inflammatory markers are essentially undetectable.  Simply due to the location I would err on the side of treatment with antibiotics and close observation.  I do not feel he needs to be hospitalized but he knows to return if the swelling is worsening.  He specifically denies any trauma.  Exact cause for the swelling is unclear at this time.  Patient and his significant other are comfortable with the plan at this time and will follow-up with her regular physician    Disposition   The patient was discharged.     Diagnosis     ICD-10-CM    1. Submental swelling with concern for infection  K12.2            Discharge Medications   New Prescriptions    AMOXICILLIN-CLAVULANATE (AUGMENTIN) 875-125 MG TABLET    Take 1 tablet by mouth 2 times daily for 10 days.     Scribe Disclosure:  I, Evelia Bauman, am serving as a scribe at 11:09 PM on 9/18/2024 to document services personally performed by Pedro Medeiros MD based on my observations and the provider's statements to me.        Pedro Medeiros MD  09/19/24 0118

## 2024-10-01 NOTE — DISCHARGE INSTRUCTIONS
For pain, you can take up to 1000 mg or 1 g of Tylenol.  You can take 600 mg of ibuprofen at one time.  You can alternate these medications every 3 hours.  Always take ibuprofen with food.  Never take more than 4 g (4000 mg) of Tylenol or 3200mg of ibuprofen in one day.  Do not take this amount for more than 1 week at a time.     Use oxycodone for breakthrough pain. This is sedating. Do not drive after taking.   fall

## 2024-10-04 ENCOUNTER — OFFICE VISIT (OUTPATIENT)
Dept: FAMILY MEDICINE | Facility: CLINIC | Age: 39
End: 2024-10-04
Payer: COMMERCIAL

## 2024-10-04 VITALS
TEMPERATURE: 97.8 F | HEIGHT: 66 IN | BODY MASS INDEX: 34.55 KG/M2 | RESPIRATION RATE: 14 BRPM | DIASTOLIC BLOOD PRESSURE: 76 MMHG | HEART RATE: 60 BPM | SYSTOLIC BLOOD PRESSURE: 122 MMHG | WEIGHT: 215 LBS | OXYGEN SATURATION: 97 %

## 2024-10-04 DIAGNOSIS — J30.89 SEASONAL ALLERGIC RHINITIS DUE TO OTHER ALLERGIC TRIGGER: ICD-10-CM

## 2024-10-04 DIAGNOSIS — R22.0 FACIAL SWELLING: Primary | ICD-10-CM

## 2024-10-04 DIAGNOSIS — Z23 ENCOUNTER FOR IMMUNIZATION: ICD-10-CM

## 2024-10-04 PROCEDURE — 99213 OFFICE O/P EST LOW 20 MIN: CPT | Mod: 25

## 2024-10-04 PROCEDURE — 90471 IMMUNIZATION ADMIN: CPT

## 2024-10-04 PROCEDURE — 90656 IIV3 VACC NO PRSV 0.5 ML IM: CPT

## 2024-10-04 RX ORDER — CETIRIZINE HYDROCHLORIDE 10 MG/1
10 TABLET ORAL DAILY
Qty: 90 TABLET | Refills: 3 | Status: SHIPPED | OUTPATIENT
Start: 2024-10-04

## 2024-10-04 ASSESSMENT — PAIN SCALES - GENERAL: PAINLEVEL: SEVERE PAIN (6)

## 2024-10-04 NOTE — PROGRESS NOTES
"  Assessment & Plan     (R22.0) Facial swelling  (primary encounter diagnosis)  Comment: resolved. No longer present. Patient doing much better. Continue to monitor. Follow up if noting recurrence. Patient fully understands and is agreeable with plan of care, at this point patient will follow up as needed unless acute concerns arise in the meantime.    (J30.89) Seasonal allergic rhinitis due to other allergic trigger  Comment: would like to have Zyrtec refilled. Provided. Allergies have been stable since starting. Discussed getting OTC if cheaper compared to pharmacy dispensed.   Plan: cetirizine (ZYRTEC) 10 MG tablet    (Z23) Encounter for immunization  Comment: updated.   Plan: INFLUENZA VACCINE,SPLIT         VIRUS,TRIVALENT,PF(FLUZONE)        The longitudinal plan of care for the diagnosis(es)/condition(s) as documented were addressed during this visit. Due to the added complexity in care, I will continue to support Ancelmo in the subsequent management and with ongoing continuity of care.    MED REC REQUIRED  Post Medication Reconciliation Status: discharge medications reconciled, continue medications without change  BMI  Estimated body mass index is 34.7 kg/m  as calculated from the following:    Height as of this encounter: 1.676 m (5' 6\").    Weight as of this encounter: 97.5 kg (215 lb).     Sb Ag is a 39 year old, presenting for the following health issues:  Hospital F/U        10/4/2024     2:50 PM   Additional Questions   Roomed by Kary Gates   Accompanied by Leonora         10/4/2024     2:47 PM   Patient Reported Additional Medications   Patient reports taking the following new medications cetirizine 10 mg     HPI     ED/UC Followup:    Facility:  [facility]  Date of visit: 9/18/2024  Reason for visit: Facial Swelling   Current Status: Improved    Requesting refill for cetirizine 10 mg    Review of Systems  Constitutional, HEENT, pulmonary systems are negative, except as otherwise noted.    " "  Objective    /76 (BP Location: Right arm, Patient Position: Sitting, Cuff Size: Adult Large)   Pulse 60   Temp 97.8  F (36.6  C) (Temporal)   Resp 14   Ht 1.676 m (5' 6\")   Wt 97.5 kg (215 lb)   SpO2 97%   BMI 34.70 kg/m    Body mass index is 34.7 kg/m .  Physical Exam  Vitals and nursing note reviewed.   Constitutional:       Appearance: Normal appearance. He is well-developed. He is not ill-appearing or toxic-appearing.   HENT:      Head: Normocephalic.      Jaw: No tenderness or swelling.      Salivary Glands: Right salivary gland is not diffusely enlarged or tender. Left salivary gland is not diffusely enlarged or tender.      Comments: No submental swelling noted.   Cardiovascular:      Rate and Rhythm: Normal rate.   Pulmonary:      Effort: Pulmonary effort is normal.   Neurological:      Mental Status: He is alert.   Psychiatric:         Attention and Perception: Attention and perception normal.         Mood and Affect: Mood normal.         Speech: Speech normal.         Behavior: Behavior normal. Behavior is cooperative.         Thought Content: Thought content normal.         Judgment: Judgment normal.          Signed Electronically by: FRANCE Moran CNP    "

## 2024-10-04 NOTE — PATIENT INSTRUCTIONS
Zyrtec re ordered    At this point follow up if  noting recurring swelling or worsening symptoms .

## 2025-02-24 ENCOUNTER — OFFICE VISIT (OUTPATIENT)
Dept: INTERNAL MEDICINE | Facility: CLINIC | Age: 40
End: 2025-02-24
Payer: COMMERCIAL

## 2025-02-24 VITALS
SYSTOLIC BLOOD PRESSURE: 128 MMHG | HEART RATE: 66 BPM | HEIGHT: 66 IN | WEIGHT: 227 LBS | DIASTOLIC BLOOD PRESSURE: 86 MMHG | RESPIRATION RATE: 20 BRPM | OXYGEN SATURATION: 98 % | BODY MASS INDEX: 36.48 KG/M2 | TEMPERATURE: 98.3 F

## 2025-02-24 DIAGNOSIS — Z23 NEED FOR COVID-19 VACCINE: Primary | ICD-10-CM

## 2025-02-24 DIAGNOSIS — Z23 NEED FOR HEPATITIS B VACCINATION: ICD-10-CM

## 2025-02-24 DIAGNOSIS — Z78.9 VARICELLA VACCINATION STATUS UNKNOWN: ICD-10-CM

## 2025-02-24 DIAGNOSIS — Z01.84: ICD-10-CM

## 2025-02-24 DIAGNOSIS — Z78.9 HISTORY OF MEASLES, MUMPS, RUBELLA (MMR) VACCINATION UNKNOWN: ICD-10-CM

## 2025-02-24 PROCEDURE — 86765 RUBEOLA ANTIBODY: CPT

## 2025-02-24 PROCEDURE — 91320 SARSCV2 VAC 30MCG TRS-SUC IM: CPT

## 2025-02-24 PROCEDURE — 90480 ADMN SARSCOV2 VAC 1/ONLY CMP: CPT

## 2025-02-24 PROCEDURE — 86787 VARICELLA-ZOSTER ANTIBODY: CPT

## 2025-02-24 PROCEDURE — 86735 MUMPS ANTIBODY: CPT

## 2025-02-24 PROCEDURE — 99213 OFFICE O/P EST LOW 20 MIN: CPT | Mod: 25

## 2025-02-24 PROCEDURE — 86762 RUBELLA ANTIBODY: CPT

## 2025-02-24 PROCEDURE — 90471 IMMUNIZATION ADMIN: CPT

## 2025-02-24 PROCEDURE — 36415 COLL VENOUS BLD VENIPUNCTURE: CPT

## 2025-02-24 PROCEDURE — 90746 HEPB VACCINE 3 DOSE ADULT IM: CPT

## 2025-02-24 NOTE — NURSING NOTE
Prior to immunization administration, verified patients identity using patient s name and date of birth. Please see Immunization Activity for additional information.     Screening Questionnaire for Adult Immunization    Are you sick today?   No   Do you have allergies to medications, food, a vaccine component or latex?   No   Have you ever had a serious reaction after receiving a vaccination?   No   Do you have a long-term health problem with heart, lung, kidney, or metabolic disease (e.g., diabetes), asthma, a blood disorder, no spleen, complement component deficiency, a cochlear implant, or a spinal fluid leak?  Are you on long-term aspirin therapy?   No   Do you have cancer, leukemia, HIV/AIDS, or any other immune system problem?   No   Do you have a parent, brother, or sister with an immune system problem?   No   In the past 3 months, have you taken medications that affect  your immune system, such as prednisone, other steroids, or anticancer drugs; drugs for the treatment of rheumatoid arthritis, Crohn s disease, or psoriasis; or have you had radiation treatments?   No   Have you had a seizure, or a brain or other nervous system problem?   No   During the past year, have you received a transfusion of blood or blood    products, or been given immune (gamma) globulin or antiviral drug?   No   For women: Are you pregnant or is there a chance you could become       pregnant during the next month?   No   Have you received any vaccinations in the past 4 weeks?   No     Immunization questionnaire answers were all negative.      Patient instructed to remain in clinic for 15 minutes afterwards, and to report any adverse reactions.     Screening performed by Mable Núñez LPN on 2/24/2025 at 9:33 AM.

## 2025-02-24 NOTE — PROGRESS NOTES
"  Assessment & Plan     Need for COVID-19 vaccine  Received COVID vaccine in clinic  - COVID-19 12+ (PFIZER)    Need for hepatitis B vaccination  Received first vaccination of hepatitis in clinic    Varicella vaccination status unknown  Being tested for varicella  immunity status    History of measles, mumps, rubella (MMR) vaccination unknown  Being tested for MM are immunity status  - Rubella Antibody IgG; Future  - Mumps Immune Status, IgG  - Rubeola Antibody IgG; Future          BMI  Estimated body mass index is 36.64 kg/m  as calculated from the following:    Height as of this encounter: 1.676 m (5' 6\").    Weight as of this encounter: 103 kg (227 lb).             Sb Ag is a 39 year old, presenting for the following health issues:   Imm/Inj      2/24/2025     8:29 AM   Additional Questions   Roomed by LINETTE Núñez LPN   Accompanied by wife- To         2/24/2025     8:29 AM   Patient Reported Additional Medications   Patient reports taking the following new medications none     Imm/Inj    History of Present Illness       Reason for visit:  Immunizations   He is taking medications regularly.  Patient presents with request to be up to date with immunizations per  immigration services.  Patient is needing a COVID-vaccine patient will have titers done for MMR and varicella status, and he is receiving hepatitis vaccine.  Patient denies any fevers night sweats or chills.  No upper respiratory infections.                Review of Systems  Constitutional, HEENT, cardiovascular, pulmonary, GI, , musculoskeletal, neuro, skin, endocrine and psych systems are negative, except as otherwise noted.      Objective    /90   Pulse 66   Temp 98.3  F (36.8  C) (Oral)   Resp 20   Ht 1.676 m (5' 6\")   Wt 103 kg (227 lb)   SpO2 98%   BMI 36.64 kg/m    Body mass index is 36.64 kg/m .  Physical Exam   GENERAL: alert and no distress  HENT: ear canals and TM's normal, nose and mouth without ulcers or " lesions  NECK: no adenopathy, no asymmetry, masses, or scars  RESP: lungs clear to auscultation - no rales, rhonchi or wheezes  CV: regular rate and rhythm, normal S1 S2, no S3 or S4, no murmur, click or rub, no peripheral edema  ABDOMEN: soft, nontender, no hepatosplenomegaly, no masses and bowel sounds normal  MS: no gross musculoskeletal defects noted, no edema  SKIN: no suspicious lesions or rashes  NEURO: Normal strength and tone, mentation intact and speech normal  PSYCH: mentation appears normal, affect normal/bright            Signed Electronically by: FRANCE Rajan CNP

## 2025-02-24 NOTE — LETTER
February 26, 2025      Ancelmo Geraldo James  [address]  [facility] [address]        Dear Mr.Martinez James,    We are writing to inform you of your test results.      Resulted Orders   Mumps Immune Status, IgG   Result Value Ref Range    Mumps Jenni IgG Instrument Value 242.0 <9.0 AU/mL    Mumps Antibody IgG Positive       Comment:      Suggests previous exposure or immunization and probable immunity.   Rubella Antibody IgG   Result Value Ref Range    Rubella Jenni IgG Instrument Value 8.65 <0.90 Index    Rubella Antibody IgG Positive       Comment:      Suggests previous exposure or immunization and probable immunity.   Rubeola Antibody IgG   Result Value Ref Range    Rubeola (Measles) Jenni IgG Instrument Value 24.6 <13.5 AU/mL    Rubeola (Measles) Antibody IgG Positive       Comment:      Suggests previous exposure or immunization and probable immunity.       If you have any questions or concerns, please call the clinic at the number listed above.       Sincerely,      FRANCE Rodríguez CNP    Electronically signed

## 2025-02-24 NOTE — LETTER
February 26, 2025      Ancelmo Geraldo James  [address]  [facility] [address]        Dear Mr.Martinez James,    We are writing to inform you of your test results.        Resulted Orders   Mumps Immune Status, IgG   Result Value Ref Range    Mumps Jenni IgG Instrument Value 242.0 <9.0 AU/mL    Mumps Antibody IgG Positive       Comment:      Suggests previous exposure or immunization and probable immunity.   Rubella Antibody IgG   Result Value Ref Range    Rubella Jenni IgG Instrument Value 8.65 <0.90 Index    Rubella Antibody IgG Positive       Comment:      Suggests previous exposure or immunization and probable immunity.   Rubeola Antibody IgG   Result Value Ref Range    Rubeola (Measles) Jenni IgG Instrument Value 24.6 <13.5 AU/mL    Rubeola (Measles) Antibody IgG Positive       Comment:      Suggests previous exposure or immunization and probable immunity.       If you have any questions or concerns, please call the clinic at the number listed above.       Sincerely,      FRANCE Rodríguez CNP    Electronically signed           Admission

## 2025-02-25 LAB
MEV IGG SER IA-ACNC: 24.6 AU/ML
MEV IGG SER IA-ACNC: POSITIVE
MUMPS ANTIBODY IGG INSTRUMENT VALUE: 242 AU/ML
MUV IGG SER QL IA: POSITIVE
RUBV IGG SERPL QL IA: 8.65 INDEX
RUBV IGG SERPL QL IA: POSITIVE

## 2025-02-26 LAB
VZV IGG SER QL IA: 14.8 S/CO
VZV IGG SER QL IA: POSITIVE

## 2025-03-03 ENCOUNTER — APPOINTMENT (OUTPATIENT)
Dept: CT IMAGING | Facility: CLINIC | Age: 40
End: 2025-03-03
Attending: EMERGENCY MEDICINE
Payer: COMMERCIAL

## 2025-03-03 ENCOUNTER — HOSPITAL ENCOUNTER (EMERGENCY)
Facility: CLINIC | Age: 40
Discharge: HOME | End: 2025-03-03
Attending: EMERGENCY MEDICINE | Admitting: EMERGENCY MEDICINE
Payer: COMMERCIAL

## 2025-03-03 ENCOUNTER — ALLIED HEALTH/NURSE VISIT (OUTPATIENT)
Dept: FAMILY MEDICINE | Facility: CLINIC | Age: 40
End: 2025-03-03
Payer: COMMERCIAL

## 2025-03-03 VITALS
SYSTOLIC BLOOD PRESSURE: 144 MMHG | DIASTOLIC BLOOD PRESSURE: 76 MMHG | HEIGHT: 69 IN | HEART RATE: 75 BPM | TEMPERATURE: 97.3 F | RESPIRATION RATE: 16 BRPM | WEIGHT: 228.62 LBS | OXYGEN SATURATION: 100 % | BODY MASS INDEX: 33.86 KG/M2

## 2025-03-03 DIAGNOSIS — M79.605 PAIN IN BOTH LOWER EXTREMITIES: ICD-10-CM

## 2025-03-03 DIAGNOSIS — R10.12 LUQ ABDOMINAL PAIN: ICD-10-CM

## 2025-03-03 DIAGNOSIS — Z23 NEED FOR PROPHYLACTIC VACCINATION AGAINST DIPHTHERIA AND TETANUS: Primary | ICD-10-CM

## 2025-03-03 DIAGNOSIS — M79.604 PAIN IN BOTH LOWER EXTREMITIES: ICD-10-CM

## 2025-03-03 DIAGNOSIS — R10.9 LEFT FLANK PAIN: ICD-10-CM

## 2025-03-03 LAB
ALBUMIN UR-MCNC: NEGATIVE MG/DL
ANION GAP SERPL CALCULATED.3IONS-SCNC: 13 MMOL/L (ref 7–15)
APPEARANCE UR: CLEAR
BASOPHILS # BLD AUTO: 0.1 10E3/UL (ref 0–0.2)
BASOPHILS NFR BLD AUTO: 1 %
BILIRUB UR QL STRIP: NEGATIVE
BUN SERPL-MCNC: 11 MG/DL (ref 6–20)
CALCIUM SERPL-MCNC: 10 MG/DL (ref 8.8–10.4)
CHLORIDE SERPL-SCNC: 100 MMOL/L (ref 98–107)
CK SERPL-CCNC: 157 U/L (ref 39–308)
COLOR UR AUTO: ABNORMAL
CREAT SERPL-MCNC: 0.77 MG/DL (ref 0.67–1.17)
EGFRCR SERPLBLD CKD-EPI 2021: >90 ML/MIN/1.73M2
EOSINOPHIL # BLD AUTO: 0.1 10E3/UL (ref 0–0.7)
EOSINOPHIL NFR BLD AUTO: 1 %
ERYTHROCYTE [DISTWIDTH] IN BLOOD BY AUTOMATED COUNT: 11.4 % (ref 10–15)
GLUCOSE SERPL-MCNC: 113 MG/DL (ref 70–99)
GLUCOSE UR STRIP-MCNC: NEGATIVE MG/DL
HCO3 SERPL-SCNC: 28 MMOL/L (ref 22–29)
HCT VFR BLD AUTO: 45.6 % (ref 40–53)
HGB BLD-MCNC: 15.9 G/DL (ref 13.3–17.7)
HGB UR QL STRIP: NEGATIVE
HOLD SPECIMEN: NORMAL
HOLD SPECIMEN: NORMAL
IMM GRANULOCYTES # BLD: 0 10E3/UL
IMM GRANULOCYTES NFR BLD: 0 %
KETONES UR STRIP-MCNC: NEGATIVE MG/DL
LEUKOCYTE ESTERASE UR QL STRIP: NEGATIVE
LIPASE SERPL-CCNC: 35 U/L (ref 13–60)
LYMPHOCYTES # BLD AUTO: 2.9 10E3/UL (ref 0.8–5.3)
LYMPHOCYTES NFR BLD AUTO: 30 %
MCH RBC QN AUTO: 30.2 PG (ref 26.5–33)
MCHC RBC AUTO-ENTMCNC: 34.9 G/DL (ref 31.5–36.5)
MCV RBC AUTO: 87 FL (ref 78–100)
MONOCYTES # BLD AUTO: 0.6 10E3/UL (ref 0–1.3)
MONOCYTES NFR BLD AUTO: 6 %
MUCOUS THREADS #/AREA URNS LPF: PRESENT /LPF
NEUTROPHILS # BLD AUTO: 6.1 10E3/UL (ref 1.6–8.3)
NEUTROPHILS NFR BLD AUTO: 62 %
NITRATE UR QL: NEGATIVE
NRBC # BLD AUTO: 0 10E3/UL
NRBC BLD AUTO-RTO: 0 /100
PH UR STRIP: 6.5 [PH] (ref 5–7)
PLATELET # BLD AUTO: 357 10E3/UL (ref 150–450)
POTASSIUM SERPL-SCNC: 4.8 MMOL/L (ref 3.4–5.3)
RBC # BLD AUTO: 5.27 10E6/UL (ref 4.4–5.9)
RBC URINE: 0 /HPF
SODIUM SERPL-SCNC: 141 MMOL/L (ref 135–145)
SP GR UR STRIP: 1.02 (ref 1–1.03)
UROBILINOGEN UR STRIP-MCNC: NORMAL MG/DL
WBC # BLD AUTO: 9.9 10E3/UL (ref 4–11)
WBC URINE: 1 /HPF

## 2025-03-03 PROCEDURE — 99207 PR NO CHARGE NURSE ONLY: CPT

## 2025-03-03 PROCEDURE — 96375 TX/PRO/DX INJ NEW DRUG ADDON: CPT | Mod: 59 | Performed by: EMERGENCY MEDICINE

## 2025-03-03 PROCEDURE — 250N000011 HC RX IP 250 OP 636: Performed by: EMERGENCY MEDICINE

## 2025-03-03 PROCEDURE — 85048 AUTOMATED LEUKOCYTE COUNT: CPT | Performed by: EMERGENCY MEDICINE

## 2025-03-03 PROCEDURE — 250N000009 HC RX 250: Performed by: EMERGENCY MEDICINE

## 2025-03-03 PROCEDURE — 85025 COMPLETE CBC W/AUTO DIFF WBC: CPT | Performed by: EMERGENCY MEDICINE

## 2025-03-03 PROCEDURE — 83690 ASSAY OF LIPASE: CPT | Performed by: EMERGENCY MEDICINE

## 2025-03-03 PROCEDURE — 99285 EMERGENCY DEPT VISIT HI MDM: CPT | Mod: 25 | Performed by: EMERGENCY MEDICINE

## 2025-03-03 PROCEDURE — 250N000013 HC RX MED GY IP 250 OP 250 PS 637: Performed by: EMERGENCY MEDICINE

## 2025-03-03 PROCEDURE — 82374 ASSAY BLOOD CARBON DIOXIDE: CPT | Performed by: EMERGENCY MEDICINE

## 2025-03-03 PROCEDURE — 90715 TDAP VACCINE 7 YRS/> IM: CPT

## 2025-03-03 PROCEDURE — 80048 BASIC METABOLIC PNL TOTAL CA: CPT | Performed by: EMERGENCY MEDICINE

## 2025-03-03 PROCEDURE — 74177 CT ABD & PELVIS W/CONTRAST: CPT

## 2025-03-03 PROCEDURE — 90471 IMMUNIZATION ADMIN: CPT

## 2025-03-03 PROCEDURE — 96374 THER/PROPH/DIAG INJ IV PUSH: CPT | Mod: 59 | Performed by: EMERGENCY MEDICINE

## 2025-03-03 PROCEDURE — 85004 AUTOMATED DIFF WBC COUNT: CPT | Performed by: EMERGENCY MEDICINE

## 2025-03-03 PROCEDURE — 82550 ASSAY OF CK (CPK): CPT | Performed by: EMERGENCY MEDICINE

## 2025-03-03 PROCEDURE — 81003 URINALYSIS AUTO W/O SCOPE: CPT | Performed by: EMERGENCY MEDICINE

## 2025-03-03 PROCEDURE — 36415 COLL VENOUS BLD VENIPUNCTURE: CPT | Performed by: EMERGENCY MEDICINE

## 2025-03-03 RX ORDER — DIPHENHYDRAMINE HYDROCHLORIDE 50 MG/ML
25 INJECTION, SOLUTION INTRAMUSCULAR; INTRAVENOUS ONCE
Status: COMPLETED | OUTPATIENT
Start: 2025-03-03 | End: 2025-03-03

## 2025-03-03 RX ORDER — LIDOCAINE 40 MG/G
CREAM TOPICAL
Status: DISCONTINUED | OUTPATIENT
Start: 2025-03-03 | End: 2025-03-03 | Stop reason: HOSPADM

## 2025-03-03 RX ORDER — METHYLPREDNISOLONE SODIUM SUCCINATE 125 MG/2ML
125 INJECTION INTRAMUSCULAR; INTRAVENOUS ONCE
Status: COMPLETED | OUTPATIENT
Start: 2025-03-03 | End: 2025-03-03

## 2025-03-03 RX ORDER — GABAPENTIN 300 MG/1
300 CAPSULE ORAL 3 TIMES DAILY PRN
Qty: 30 CAPSULE | Refills: 0 | Status: SHIPPED | OUTPATIENT
Start: 2025-03-03

## 2025-03-03 RX ORDER — FAMOTIDINE 20 MG/1
20 TABLET, FILM COATED ORAL ONCE
Status: COMPLETED | OUTPATIENT
Start: 2025-03-03 | End: 2025-03-03

## 2025-03-03 RX ORDER — METHYLPREDNISOLONE 4 MG/1
TABLET ORAL
Qty: 21 TABLET | Refills: 0 | Status: SHIPPED | OUTPATIENT
Start: 2025-03-03

## 2025-03-03 RX ORDER — MAGNESIUM HYDROXIDE/ALUMINUM HYDROXICE/SIMETHICONE 120; 1200; 1200 MG/30ML; MG/30ML; MG/30ML
15 SUSPENSION ORAL ONCE
Status: COMPLETED | OUTPATIENT
Start: 2025-03-03 | End: 2025-03-03

## 2025-03-03 RX ORDER — IOPAMIDOL 755 MG/ML
500 INJECTION, SOLUTION INTRAVASCULAR ONCE
Status: COMPLETED | OUTPATIENT
Start: 2025-03-03 | End: 2025-03-03

## 2025-03-03 RX ORDER — OMEPRAZOLE 20 MG/1
20 CAPSULE, DELAYED RELEASE ORAL 2 TIMES DAILY
Qty: 60 CAPSULE | Refills: 0 | Status: SHIPPED | OUTPATIENT
Start: 2025-03-03 | End: 2025-04-02

## 2025-03-03 RX ORDER — KETOROLAC TROMETHAMINE 15 MG/ML
15 INJECTION, SOLUTION INTRAMUSCULAR; INTRAVENOUS ONCE
Status: COMPLETED | OUTPATIENT
Start: 2025-03-03 | End: 2025-03-03

## 2025-03-03 RX ADMIN — FAMOTIDINE 20 MG: 10 INJECTION, SOLUTION INTRAVENOUS at 20:05

## 2025-03-03 RX ADMIN — DIPHENHYDRAMINE HYDROCHLORIDE 25 MG: 50 INJECTION, SOLUTION INTRAMUSCULAR; INTRAVENOUS at 20:04

## 2025-03-03 RX ADMIN — FAMOTIDINE 20 MG: 20 TABLET, FILM COATED ORAL at 19:34

## 2025-03-03 RX ADMIN — METHYLPREDNISOLONE SODIUM SUCCINATE 125 MG: 125 INJECTION, POWDER, FOR SOLUTION INTRAMUSCULAR; INTRAVENOUS at 20:08

## 2025-03-03 RX ADMIN — IOPAMIDOL 100 ML: 755 INJECTION, SOLUTION INTRAVENOUS at 19:51

## 2025-03-03 RX ADMIN — ALUMINUM HYDROXIDE, MAGNESIUM HYDROXIDE, AND SIMETHICONE 15 ML: 200; 200; 20 SUSPENSION ORAL at 19:32

## 2025-03-03 RX ADMIN — SODIUM CHLORIDE 65 ML: 9 INJECTION, SOLUTION INTRAVENOUS at 19:51

## 2025-03-03 RX ADMIN — KETOROLAC TROMETHAMINE 15 MG: 15 INJECTION, SOLUTION INTRAMUSCULAR; INTRAVENOUS at 19:32

## 2025-03-03 ASSESSMENT — ACTIVITIES OF DAILY LIVING (ADL)
ADLS_ACUITY_SCORE: 41

## 2025-03-03 NOTE — Clinical Note
March 3, 2025      To Whom It May Concern:      Ancelmo James was seen in our Emergency Department today, 03/03/25.  I expect his condition to improve over the next *** days.  He may return to work/school when improved.    Sincerely,        Adrian Good RN  Electronically signed

## 2025-03-03 NOTE — PROGRESS NOTES
Prior to immunization administration, verified patients identity using patient s name and date of birth. Please see Immunization Activity for additional information.     Is the patient's temperature normal (100.5 or less)? Yes     Patient MEETS CRITERIA. PROCEED with vaccine administration.          3/3/2025   TD/TDAP   Have you had a serious reaction to a Td or Tdap vaccine or to something in these vaccines? No   Have you had coma, fainting, or seizures (encephalopathy) within 1 week of getting a DTP, DTaP, or Tdap vaccine? No   Have you had a serious reaction to thimerosal? No   Have you had a reaction (swelling, bleeding, gangrene) to a tetanus, diphtheria, or meningococcal vaccine? No   Have you had Guillain-Keene syndrome within 6 weeks of getting a vaccine? No   Do you have seizures that aren't controlled, encephalopathy that's getting worse, or a brain disorder that's unstable or getting worse? No   Do you have an allergy to latex? No   Have you had a puncture wound, bite wound, wound with something in it, or dirty wound in the past 3 weeks? No         Patient MEETS CRITERIA. PROCEED with vaccine administration.     TDAP PREFERRED. TD acceptable if requested by the patient.      Patient instructed to remain in clinic for 15 minutes afterwards, and to report any adverse reactions.      Link to Ancillary Visit Immunization Standing Orders SmartSet     Screening performed by Kary Gamboa CMA on 3/3/2025 at 2:53 PM.

## 2025-03-03 NOTE — LETTER
March 3, 2025      To Whom It May Concern:      Ancelmo James was seen in our Emergency Department today, 03/03/25.  I expect his condition to improve over the next 2 days.  He may return to work/school on Thursday, 03/06/2025.    Sincerely,        Adrian Good RN  Electronically signed

## 2025-03-04 NOTE — ED TRIAGE NOTES
Pt arrives for L flank pain that radiates to the L abdomen. He also reports tingling in all extremities and heat to the bottom of the feet. Urinary frequency. He reports he recently had multiple vaccinations in the past week. AVSS on RA.

## 2025-03-04 NOTE — ED PROVIDER NOTES
"  Emergency Department Note      History of Present Illness     Chief Complaint   Flank Pain      HPI   Ancelmo James is a 39 year old male who presents with left flank and LUQ pain. Pt also with intermittent BL anterior leg pain. Sx seems to occur together. NO vomiting or diarrhea. NO clear modifying factors such as eating.Sx started last night. Pt tried APAP and naproxen without relief. NO dysuria or hematuria. NO prior abdominal surgeries. Pt had fall injuring his back 6-7 years ago and saw a chiropractor for this. Current sx feel similar. NO numbness in perineum or trouble emptying the bladder. No leg weakness but they feel sore.       Pt declines professional  in english.    Past Medical History     Medical History and Problem List   Past Medical History:   Diagnosis Date    Seasonal allergic rhinitis        Medications   gabapentin (NEURONTIN) 300 MG capsule  methylPREDNISolone (MEDROL DOSEPAK) 4 MG tablet therapy pack  omeprazole (PRILOSEC) 20 MG DR capsule  cetirizine (ZYRTEC) 10 MG tablet  fluticasone (FLONASE) 50 MCG/ACT nasal spray        Surgical History   No past surgical history on file.    Physical Exam     Patient Vitals for the past 24 hrs:   BP Temp Temp src Pulse Resp SpO2 Height Weight   03/03/25 2110 (!) 144/76 -- -- -- 16 100 % -- --   03/03/25 2017 (!) 141/91 -- -- 75 -- 99 % -- --   03/03/25 2012 139/88 -- -- 67 -- 99 % -- --   03/03/25 1829 (!) 160/105 -- -- -- -- -- -- --   03/03/25 1828 -- 97.3  F (36.3  C) Temporal 70 18 98 % 1.753 m (5' 9\") 103.7 kg (228 lb 9.9 oz)     Physical Exam  VS: Reviewed per above  HENT: Mucous membranes moist, no nuchal rigidity  EYES: sclera anicteric  CV: Rate as noted,  regular rhythm.   RESP: Effort normal. Breath sounds are normal bilaterally.  GI: mild luq/L cva tenderness without rebound/guarding, not distended.  NEURO: GCS 15, cranial nerves II through XII are intact, 5 out of 5 strength in all 4 extremities, sensation is " intact light touch in all 4 extremities. 1+ patellar reflexes BL.   MSK: No deformity of the extremities  SKIN: Warm and dry      Diagnostics     Lab Results   Labs Ordered and Resulted from Time of ED Arrival to Time of ED Departure   ROUTINE UA WITH MICROSCOPIC REFLEX TO CULTURE - Abnormal       Result Value    Color Urine Light Yellow      Appearance Urine Clear      Glucose Urine Negative      Bilirubin Urine Negative      Ketones Urine Negative      Specific Gravity Urine 1.021      Blood Urine Negative      pH Urine 6.5      Protein Albumin Urine Negative      Urobilinogen Urine Normal      Nitrite Urine Negative      Leukocyte Esterase Urine Negative      Mucus Urine Present (*)     RBC Urine 0      WBC Urine 1     BASIC METABOLIC PANEL - Abnormal    Sodium 141      Potassium 4.8      Chloride 100      Carbon Dioxide (CO2) 28      Anion Gap 13      Urea Nitrogen 11.0      Creatinine 0.77      GFR Estimate >90      Calcium 10.0      Glucose 113 (*)    CK TOTAL - Normal         LIPASE - Normal    Lipase 35     CBC WITH PLATELETS AND DIFFERENTIAL    WBC Count 9.9      RBC Count 5.27      Hemoglobin 15.9      Hematocrit 45.6      MCV 87      MCH 30.2      MCHC 34.9      RDW 11.4      Platelet Count 357      % Neutrophils 62      % Lymphocytes 30      % Monocytes 6      % Eosinophils 1      % Basophils 1      % Immature Granulocytes 0      NRBCs per 100 WBC 0      Absolute Neutrophils 6.1      Absolute Lymphocytes 2.9      Absolute Monocytes 0.6      Absolute Eosinophils 0.1      Absolute Basophils 0.1      Absolute Immature Granulocytes 0.0      Absolute NRBCs 0.0         Imaging   CT Abdomen Pelvis w Contrast   Final Result   IMPRESSION:    1.  2 mm stone in the left kidney. Abdomen pelvis otherwise negative.          ED Course      Medications Administered   Medications   alum & mag hydroxide-simethicone (MAALOX) suspension 15 mL (15 mLs Oral $Given 3/3/25 1932)   famotidine (PEPCID) tablet 20 mg (20 mg  Oral $Given 3/3/25 1934)   ketorolac (TORADOL) injection 15 mg (15 mg Intravenous $Given 3/3/25 1932)   CT Scan Flush (65 mLs Intravenous $Given 3/3/25 1951)   iopamidol (ISOVUE-370) solution 500 mL (100 mLs Intravenous $Given 3/3/25 1951)   diphenhydrAMINE (BENADRYL) injection 25 mg (25 mg Intravenous $Given 3/3/25 2004)   famotidine (PEPCID) injection 20 mg (20 mg Intravenous $Given 3/3/25 2005)   methylPREDNISolone Na Suc (solu-MEDROL) injection 125 mg (125 mg Intravenous $Given 3/3/25 2008)   diphenhydrAMINE (BENADRYL) injection 25 mg (25 mg Intravenous Not Given 3/3/25 2007)       Procedures   Procedures         Medical Decision Making / Diagnosis     LOURDES James is a 39 year old male who presents to the ER for evaluation of left upper abdominal pain left flank pain as well as some recent burning sensation in the anterior bilateral thighs and lower legs.  With respect to abdominal and flank pain, there are no clear signs of zoster.  CT abdomen without acute pathology to explain his symptoms either.  Labs do not show signs of pancreatitis or HUMERA or concerning leukocytosis or UTI.      With respect to leg burning sensation, it does seem neuropathic/radicular.  No signs of cauda equina syndrome or conus medullaris syndrome or other spinal cord compression process. He does have bilateral patellar reflexes and has not clearly established symptoms at this point consistent with Guillain-Barré syndrome.  Strict return precautions discussed with patient prior to discharge.  Plan for Medrol Dosepak and gabapentin for possible radicular/neuropathic symptoms in his legs and omeprazole in the event of superimposed gastritis or peptic ulcer disease.  He might also benefit from gastric protection in the setting of Medrol dose pack.    Disposition   The patient was discharged.     Diagnosis     ICD-10-CM    1. LUQ abdominal pain  R10.12       2. Left flank pain  R10.9       3. Pain in both lower extremities   M79.604     M79.605            Discharge Medications   Discharge Medication List as of 3/3/2025  9:05 PM        START taking these medications    Details   gabapentin (NEURONTIN) 300 MG capsule Take 1 capsule (300 mg) by mouth 3 times daily as needed for neuropathic pain., Disp-30 capsule, R-0, Local Print      methylPREDNISolone (MEDROL DOSEPAK) 4 MG tablet therapy pack Follow Package Directions, Disp-21 tablet, R-0, Local Print      omeprazole (PRILOSEC) 20 MG DR capsule Take 1 capsule (20 mg) by mouth 2 times daily., Disp-60 capsule, R-0, Local Print              Tez Son MD  03/04/25 0106

## 2025-03-10 ENCOUNTER — OFFICE VISIT (OUTPATIENT)
Dept: INTERNAL MEDICINE | Facility: CLINIC | Age: 40
End: 2025-03-10
Payer: COMMERCIAL

## 2025-03-10 VITALS
HEART RATE: 85 BPM | WEIGHT: 220.7 LBS | TEMPERATURE: 97.9 F | RESPIRATION RATE: 18 BRPM | DIASTOLIC BLOOD PRESSURE: 88 MMHG | BODY MASS INDEX: 32.59 KG/M2 | SYSTOLIC BLOOD PRESSURE: 138 MMHG | OXYGEN SATURATION: 98 %

## 2025-03-10 DIAGNOSIS — M79.605 PAIN IN BOTH LOWER EXTREMITIES: ICD-10-CM

## 2025-03-10 DIAGNOSIS — M79.604 PAIN IN BOTH LOWER EXTREMITIES: ICD-10-CM

## 2025-03-10 DIAGNOSIS — G62.9 PERIPHERAL POLYNEUROPATHY: Primary | ICD-10-CM

## 2025-03-10 LAB
BASOPHILS # BLD AUTO: 0.1 10E3/UL (ref 0–0.2)
BASOPHILS NFR BLD AUTO: 1 %
EOSINOPHIL # BLD AUTO: 0.2 10E3/UL (ref 0–0.7)
EOSINOPHIL NFR BLD AUTO: 2 %
ERYTHROCYTE [DISTWIDTH] IN BLOOD BY AUTOMATED COUNT: 11.3 % (ref 10–15)
EST. AVERAGE GLUCOSE BLD GHB EST-MCNC: 105 MG/DL
HBA1C MFR BLD: 5.3 % (ref 0–5.6)
HCT VFR BLD AUTO: 44.4 % (ref 40–53)
HGB BLD-MCNC: 15.8 G/DL (ref 13.3–17.7)
IMM GRANULOCYTES # BLD: 0 10E3/UL
IMM GRANULOCYTES NFR BLD: 0 %
LYMPHOCYTES # BLD AUTO: 3.6 10E3/UL (ref 0.8–5.3)
LYMPHOCYTES NFR BLD AUTO: 31 %
MCH RBC QN AUTO: 30.8 PG (ref 26.5–33)
MCHC RBC AUTO-ENTMCNC: 35.6 G/DL (ref 31.5–36.5)
MCV RBC AUTO: 87 FL (ref 78–100)
MONOCYTES # BLD AUTO: 0.8 10E3/UL (ref 0–1.3)
MONOCYTES NFR BLD AUTO: 7 %
NEUTROPHILS # BLD AUTO: 6.9 10E3/UL (ref 1.6–8.3)
NEUTROPHILS NFR BLD AUTO: 60 %
PLATELET # BLD AUTO: 352 10E3/UL (ref 150–450)
RBC # BLD AUTO: 5.13 10E6/UL (ref 4.4–5.9)
WBC # BLD AUTO: 11.5 10E3/UL (ref 4–11)

## 2025-03-10 PROCEDURE — 99214 OFFICE O/P EST MOD 30 MIN: CPT | Performed by: NURSE PRACTITIONER

## 2025-03-10 PROCEDURE — 84443 ASSAY THYROID STIM HORMONE: CPT | Performed by: NURSE PRACTITIONER

## 2025-03-10 PROCEDURE — 80048 BASIC METABOLIC PNL TOTAL CA: CPT | Performed by: NURSE PRACTITIONER

## 2025-03-10 PROCEDURE — T1013 SIGN LANG/ORAL INTERPRETER: HCPCS | Mod: U4

## 2025-03-10 PROCEDURE — 82607 VITAMIN B-12: CPT | Performed by: NURSE PRACTITIONER

## 2025-03-10 PROCEDURE — 86140 C-REACTIVE PROTEIN: CPT | Performed by: NURSE PRACTITIONER

## 2025-03-10 PROCEDURE — G2211 COMPLEX E/M VISIT ADD ON: HCPCS | Performed by: NURSE PRACTITIONER

## 2025-03-10 PROCEDURE — 36415 COLL VENOUS BLD VENIPUNCTURE: CPT | Performed by: NURSE PRACTITIONER

## 2025-03-10 PROCEDURE — 85025 COMPLETE CBC W/AUTO DIFF WBC: CPT | Performed by: NURSE PRACTITIONER

## 2025-03-10 PROCEDURE — 83036 HEMOGLOBIN GLYCOSYLATED A1C: CPT | Performed by: NURSE PRACTITIONER

## 2025-03-10 RX ORDER — MELOXICAM 15 MG/1
15 TABLET ORAL DAILY
Qty: 30 TABLET | Refills: 0 | Status: SHIPPED | OUTPATIENT
Start: 2025-03-10

## 2025-03-10 ASSESSMENT — PAIN SCALES - GENERAL: PAINLEVEL_OUTOF10: NO PAIN (0)

## 2025-03-10 NOTE — PROGRESS NOTES
Assessment & Plan   Problem List Items Addressed This Visit       Peripheral polyneuropathy - Primary    Relevant Medications    meloxicam (MOBIC) 15 MG tablet    Other Relevant Orders    Vitamin B12 (Completed)    TSH with free T4 reflex (Completed)    CBC with platelets and differential (Completed)    Hemoglobin A1c (Completed)    MR Lumbar Spine w/o Contrast    CRP, inflammation (Completed)    Basic metabolic panel  (Ca, Cl, CO2, Creat, Gluc, K, Na, BUN)     Other Visit Diagnoses       Pain in both lower extremities        Relevant Medications    meloxicam (MOBIC) 15 MG tablet           Assessment & Plan  Peripheral Neuropathy  Burning sensation and pain in hands and feet, with improvement in hands but persistent symptoms in feet. Etiology uncertain, possible vaccine reaction, nerve compression, or vitamin deficiency.  - Order MRI of lumbar spine to assess for nerve compression.  - Perform blood tests for blood glucose, thyroid function, and vitamin B12 levels.  - Prescribe additional medication, mobic once daily with gabapentin for pain management.  -ER note from 3/3/25 reviewed   -took Medrol dosepak     Hypertension  Initial elevated blood pressure likely due to pain and anxiety, improved after relaxation.  - Monitor blood pressure.    Consent was obtained from the patient to use an AI documentation tool in the creation of this note.    The longitudinal plan of care for the diagnosis(es)/condition(s) as documented were addressed during this visit. Due to the added complexity in care, I will continue to support Ancelmo in the subsequent management and with ongoing continuity of care.    Will determine follow up once testing is back.       30 minutes spent by me on the date of the encounter doing chart review, history and exam, documentation and further activities per the note     MED REC REQUIRED  Post Medication Reconciliation Status: discharge medications reconciled, continue medications without  "change  BMI  Estimated body mass index is 32.59 kg/m  as calculated from the following:    Height as of 3/3/25: 1.753 m (5' 9\").    Weight as of this encounter: 100.1 kg (220 lb 11.2 oz).             Sb Ag is a 39 year old, presenting for the following health issues:  Hospital F/U        3/10/2025     4:00 PM   Additional Questions   Roomed by hope r   Accompanied by wife         3/10/2025     4:00 PM   Patient Reported Additional Medications   Patient reports taking the following new medications complex B vitamin     HPI    History of Present Illness  The patient presents with burning pain and tingling in the extremities. He is accompanied by a caregiver who assists with communication.    He experiences burning pain in his hands, feet, and muscles, which began last Monday. The sensation initially affected his back but has since resolved there. The burning persists in his feet and hands, with the feet being more consistently affected. He also has tingling sensations running down both legs.    The symptoms began after receiving COVID and hepatitis B vaccines last week. He recalls experiencing similar symptoms after a previous COVID vaccine. Additionally, he mentions a recent incident where he lifted a heavy object, which he suspects might have contributed to his symptoms.    He went to ER for symptoms on 3/3/2025.  In the emergency room, he was prescribed gabapentin and a steroid, which he continues to take. Gabapentin has helped with the pain in his knees and legs but not in his feet. He has also added a B12 complex to his regimen. Despite these treatments, he has been unable to return to work for a week due to the pain, particularly because he cannot bear to wear shoes for long periods.    He has a fever of up to 102 F at night. No recent injuries aside from the heavy lifting incident. He has a history of back injury from six to seven years ago, and he experienced similar symptoms in 2020, which " resolved after chiropractic care and medication, including steroids.                          Objective    /88   Pulse 85   Temp 97.9  F (36.6  C) (Tympanic)   Resp 18   Wt 100.1 kg (220 lb 11.2 oz)   SpO2 98%   BMI 32.59 kg/m    Body mass index is 32.59 kg/m .  Physical Exam  Vitals and nursing note reviewed.   Constitutional:       General: He is not in acute distress.     Appearance: Normal appearance. He is not ill-appearing.   HENT:      Head: Normocephalic and atraumatic.   Cardiovascular:      Rate and Rhythm: Normal rate and regular rhythm.      Pulses: Normal pulses.           Dorsalis pedis pulses are 2+ on the right side and 2+ on the left side.        Posterior tibial pulses are 2+ on the right side and 2+ on the left side.   Pulmonary:      Effort: Pulmonary effort is normal.      Breath sounds: Normal breath sounds.   Abdominal:      General: Bowel sounds are normal.      Palpations: Abdomen is soft.   Musculoskeletal:      Lumbar back: No tenderness or bony tenderness. Normal range of motion. Negative right straight leg raise test and negative left straight leg raise test.   Feet:      Right foot:      Skin integrity: Skin integrity normal.      Left foot:      Skin integrity: Skin integrity normal.   Neurological:      General: No focal deficit present.      Mental Status: He is alert and oriented to person, place, and time. Mental status is at baseline.      Motor: Motor function is intact.      Coordination: Coordination is intact.      Gait: Gait is intact.      Deep Tendon Reflexes:      Reflex Scores:       Patellar reflexes are 2+ on the right side and 2+ on the left side.                   Signed Electronically by: Celeste Melendrez CNP

## 2025-03-12 ENCOUNTER — TELEPHONE (OUTPATIENT)
Dept: INTERNAL MEDICINE | Facility: CLINIC | Age: 40
End: 2025-03-12
Payer: COMMERCIAL

## 2025-03-12 LAB
ANION GAP SERPL CALCULATED.3IONS-SCNC: 7 MMOL/L (ref 7–15)
BUN SERPL-MCNC: 20.7 MG/DL (ref 6–20)
CALCIUM SERPL-MCNC: 9.7 MG/DL (ref 8.8–10.4)
CHLORIDE SERPL-SCNC: 101 MMOL/L (ref 98–107)
CREAT SERPL-MCNC: 0.8 MG/DL (ref 0.67–1.17)
CRP SERPL-MCNC: <3 MG/L
EGFRCR SERPLBLD CKD-EPI 2021: >90 ML/MIN/1.73M2
GLUCOSE SERPL-MCNC: 102 MG/DL (ref 70–99)
HCO3 SERPL-SCNC: 31 MMOL/L (ref 22–29)
POTASSIUM SERPL-SCNC: 4.6 MMOL/L (ref 3.4–5.3)
SODIUM SERPL-SCNC: 139 MMOL/L (ref 135–145)
TSH SERPL DL<=0.005 MIU/L-ACNC: 1.86 UIU/ML (ref 0.3–4.2)
VIT B12 SERPL-MCNC: 419 PG/ML (ref 232–1245)

## 2025-03-12 NOTE — TELEPHONE ENCOUNTER
Wife To calls back. Advised. Gave her # to call for MRI scheduling. She verbalized understanding.

## 2025-03-12 NOTE — TELEPHONE ENCOUNTER
ID # [id number]      Called patient with a  and left a message to call the clinic back.    -------------------------------------------------------------------------------------------------  When patient calls back, please relay message from provider:  Team - please call patient with results.  Let patient know that CBC looks okay, his white count is up slightly but this is likely due to the steroids he is on.  Vitamin B12 level came back fine, thyroid came back fine, inflammatory marker came back fine and the blood sugar lab came back fine.  Next step is to do the MRI which has already been ordered.  Everything in labs looks ok.

## 2025-03-13 ENCOUNTER — PATIENT OUTREACH (OUTPATIENT)
Dept: CARE COORDINATION | Facility: CLINIC | Age: 40
End: 2025-03-13
Payer: COMMERCIAL

## 2025-03-27 ENCOUNTER — HOSPITAL ENCOUNTER (OUTPATIENT)
Dept: MRI IMAGING | Facility: CLINIC | Age: 40
Discharge: HOME | End: 2025-03-27
Attending: NURSE PRACTITIONER
Payer: COMMERCIAL

## 2025-03-27 ENCOUNTER — PATIENT OUTREACH (OUTPATIENT)
Dept: CARE COORDINATION | Facility: CLINIC | Age: 40
End: 2025-03-27
Payer: COMMERCIAL

## 2025-03-27 DIAGNOSIS — G62.9 PERIPHERAL POLYNEUROPATHY: ICD-10-CM

## 2025-03-27 PROCEDURE — 72148 MRI LUMBAR SPINE W/O DYE: CPT

## 2025-04-10 ENCOUNTER — PATIENT OUTREACH (OUTPATIENT)
Dept: CARE COORDINATION | Facility: CLINIC | Age: 40
End: 2025-04-10
Payer: COMMERCIAL

## 2025-05-11 ENCOUNTER — HEALTH MAINTENANCE LETTER (OUTPATIENT)
Age: 40
End: 2025-05-11